# Patient Record
Sex: MALE | Race: WHITE | HISPANIC OR LATINO | Employment: FULL TIME | URBAN - METROPOLITAN AREA
[De-identification: names, ages, dates, MRNs, and addresses within clinical notes are randomized per-mention and may not be internally consistent; named-entity substitution may affect disease eponyms.]

---

## 2021-01-17 ENCOUNTER — APPOINTMENT (OUTPATIENT)
Dept: RADIOLOGY | Facility: MEDICAL CENTER | Age: 36
End: 2021-01-17
Attending: EMERGENCY MEDICINE
Payer: COMMERCIAL

## 2021-01-17 ENCOUNTER — HOSPITAL ENCOUNTER (EMERGENCY)
Facility: MEDICAL CENTER | Age: 36
End: 2021-01-17
Attending: EMERGENCY MEDICINE
Payer: COMMERCIAL

## 2021-01-17 VITALS
DIASTOLIC BLOOD PRESSURE: 68 MMHG | TEMPERATURE: 98.2 F | BODY MASS INDEX: 37.38 KG/M2 | OXYGEN SATURATION: 97 % | HEART RATE: 71 BPM | RESPIRATION RATE: 18 BRPM | HEIGHT: 74 IN | WEIGHT: 291.23 LBS | SYSTOLIC BLOOD PRESSURE: 122 MMHG

## 2021-01-17 DIAGNOSIS — R03.0 ELEVATED BLOOD PRESSURE READING: ICD-10-CM

## 2021-01-17 DIAGNOSIS — R74.01 ELEVATED TRANSAMINASE LEVEL: ICD-10-CM

## 2021-01-17 DIAGNOSIS — R51.9 ACUTE NONINTRACTABLE HEADACHE, UNSPECIFIED HEADACHE TYPE: ICD-10-CM

## 2021-01-17 LAB
ALBUMIN SERPL BCP-MCNC: 4.2 G/DL (ref 3.2–4.9)
ALBUMIN/GLOB SERPL: 1.2 G/DL
ALP SERPL-CCNC: 96 U/L (ref 30–99)
ALT SERPL-CCNC: 287 U/L (ref 2–50)
ANION GAP SERPL CALC-SCNC: 11 MMOL/L (ref 7–16)
AST SERPL-CCNC: 158 U/L (ref 12–45)
BASOPHILS # BLD AUTO: 0.4 % (ref 0–1.8)
BASOPHILS # BLD: 0.04 K/UL (ref 0–0.12)
BILIRUB SERPL-MCNC: 0.8 MG/DL (ref 0.1–1.5)
BUN SERPL-MCNC: 8 MG/DL (ref 8–22)
CALCIUM SERPL-MCNC: 9.3 MG/DL (ref 8.5–10.5)
CHLORIDE SERPL-SCNC: 107 MMOL/L (ref 96–112)
CO2 SERPL-SCNC: 24 MMOL/L (ref 20–33)
CREAT SERPL-MCNC: 0.58 MG/DL (ref 0.5–1.4)
EOSINOPHIL # BLD AUTO: 0.16 K/UL (ref 0–0.51)
EOSINOPHIL NFR BLD: 1.6 % (ref 0–6.9)
ERYTHROCYTE [DISTWIDTH] IN BLOOD BY AUTOMATED COUNT: 42.6 FL (ref 35.9–50)
GLOBULIN SER CALC-MCNC: 3.4 G/DL (ref 1.9–3.5)
GLUCOSE SERPL-MCNC: 82 MG/DL (ref 65–99)
HCT VFR BLD AUTO: 47.7 % (ref 42–52)
HGB BLD-MCNC: 16.2 G/DL (ref 14–18)
IMM GRANULOCYTES # BLD AUTO: 0.04 K/UL (ref 0–0.11)
IMM GRANULOCYTES NFR BLD AUTO: 0.4 % (ref 0–0.9)
LYMPHOCYTES # BLD AUTO: 3.71 K/UL (ref 1–4.8)
LYMPHOCYTES NFR BLD: 36.2 % (ref 22–41)
MCH RBC QN AUTO: 30.7 PG (ref 27–33)
MCHC RBC AUTO-ENTMCNC: 34 G/DL (ref 33.7–35.3)
MCV RBC AUTO: 90.5 FL (ref 81.4–97.8)
MONOCYTES # BLD AUTO: 0.87 K/UL (ref 0–0.85)
MONOCYTES NFR BLD AUTO: 8.5 % (ref 0–13.4)
NEUTROPHILS # BLD AUTO: 5.44 K/UL (ref 1.82–7.42)
NEUTROPHILS NFR BLD: 52.9 % (ref 44–72)
NRBC # BLD AUTO: 0 K/UL
NRBC BLD-RTO: 0 /100 WBC
PLATELET # BLD AUTO: 302 K/UL (ref 164–446)
PMV BLD AUTO: 8.8 FL (ref 9–12.9)
POTASSIUM SERPL-SCNC: 4.1 MMOL/L (ref 3.6–5.5)
PROT SERPL-MCNC: 7.6 G/DL (ref 6–8.2)
RBC # BLD AUTO: 5.27 M/UL (ref 4.7–6.1)
SODIUM SERPL-SCNC: 142 MMOL/L (ref 135–145)
WBC # BLD AUTO: 10.3 K/UL (ref 4.8–10.8)

## 2021-01-17 PROCEDURE — 85025 COMPLETE CBC W/AUTO DIFF WBC: CPT

## 2021-01-17 PROCEDURE — 80053 COMPREHEN METABOLIC PANEL: CPT

## 2021-01-17 PROCEDURE — 700111 HCHG RX REV CODE 636 W/ 250 OVERRIDE (IP): Performed by: EMERGENCY MEDICINE

## 2021-01-17 PROCEDURE — 96374 THER/PROPH/DIAG INJ IV PUSH: CPT

## 2021-01-17 PROCEDURE — 99284 EMERGENCY DEPT VISIT MOD MDM: CPT

## 2021-01-17 PROCEDURE — 70498 CT ANGIOGRAPHY NECK: CPT

## 2021-01-17 PROCEDURE — 70496 CT ANGIOGRAPHY HEAD: CPT

## 2021-01-17 PROCEDURE — 700117 HCHG RX CONTRAST REV CODE 255: Performed by: EMERGENCY MEDICINE

## 2021-01-17 RX ORDER — MORPHINE SULFATE 4 MG/ML
4 INJECTION, SOLUTION INTRAMUSCULAR; INTRAVENOUS ONCE
Status: COMPLETED | OUTPATIENT
Start: 2021-01-17 | End: 2021-01-17

## 2021-01-17 RX ADMIN — MORPHINE SULFATE 4 MG: 4 INJECTION INTRAVENOUS at 17:00

## 2021-01-17 RX ADMIN — IOHEXOL 80 ML: 350 INJECTION, SOLUTION INTRAVENOUS at 18:10

## 2021-01-17 SDOH — HEALTH STABILITY: MENTAL HEALTH: HOW MANY STANDARD DRINKS CONTAINING ALCOHOL DO YOU HAVE ON A TYPICAL DAY?: 1 OR 2

## 2021-01-17 ASSESSMENT — LIFESTYLE VARIABLES
DOES PATIENT WANT TO STOP DRINKING: NO
DO YOU DRINK ALCOHOL: NO

## 2021-01-17 NOTE — ED TRIAGE NOTES
"Chief Complaint   Patient presents with   • Headache     X 1 week, posterior 'pressure', some nausea, denies vision changes or weakness. Denies hx of HA. Denies covid exposure, denies other viral sx.      Pt arrives to triage by self for above complaint, NAD, GCS 15, VSS on RA.     Pt returned to lobby. Educated on triage process and to inform staff of any changes.     /99   Pulse 78   Temp 36.6 °C (97.9 °F) (Temporal)   Resp 16   Ht 1.88 m (6' 2\")   Wt (!) 132.1 kg (291 lb 3.6 oz)   SpO2 98%   BMI 37.39 kg/m²      "

## 2021-01-18 NOTE — ED PROVIDER NOTES
ED Provider Note    CHIEF COMPLAINT  Chief Complaint   Patient presents with   • Headache     X 1 week, posterior 'pressure', some nausea, denies vision changes or weakness. Denies hx of HA. Denies covid exposure, denies other viral sx.         [facility]    Primary care provider: No primary care provider on file.   History obtained from: Patient  History limited by: None     Jose Miguel Luna is a 35 y.o. male who presents to the ED complaining of severe posterior headache.  This started a week ago when he woke up with subsequent forceful nausea and vomiting.  He subsequently developed severe posterior headache that has been persistent pressure since.  He states that the headache is worse with coughing or sneezing.  He denies visual change/weakness/sensory change.  He no longer has any further nausea or vomiting.  He denies recent fever/chills/congestion/sore throat/change in taste or smell/shortness of breath or difficulty breathing/diarrhea/dysuria/rash.  He reports that he has a chronic cough which is unchanged.  No prior history of headaches.  He states that he takes Advil as needed for chronic back pain and has been using Advil and ibuprofen for his headache with improvement.    REVIEW OF SYSTEMS  Please see HPI for pertinent positives/negatives.  All other systems reviewed and are negative.     PAST MEDICAL HISTORY  No past medical history on file.     SURGICAL HISTORY  History reviewed. No pertinent surgical history.     SOCIAL HISTORY  Social History     Tobacco Use   • Smoking status: Never Smoker   • Smokeless tobacco: Never Used   Substance and Sexual Activity   • Alcohol use: Yes     Drinks per session: 1 or 2   • Drug use: Yes     Comment: marijuana   • Sexual activity: Not on file        FAMILY HISTORY  History reviewed. No pertinent family history.     CURRENT MEDICATIONS  Home Medications     Reviewed by Patrick Gilmore R.N. (Registered Nurse) on 01/17/21 at 1522  Med List Status: Partial  "  Medication Last Dose Status        Patient Kendall Taking any Medications                        ALLERGIES  No Known Allergies     PHYSICAL EXAM  VITAL SIGNS: /68   Pulse 71   Temp 36.8 °C (98.2 °F)   Resp 18   Ht 1.88 m (6' 2\")   Wt (!) 132.1 kg (291 lb 3.6 oz)   SpO2 97%   BMI 37.39 kg/m²  @[id number]::@     Pulse ox interpretation: 98% I interpret this pulse ox as normal     Constitutional: Well developed, well nourished, alert in no apparent distress, nontoxic appearance    HENT: No external signs of trauma, normocephalic, oropharynx moist and clear, nose normal    Eyes: PERRL, EOMI, vision and visual fields are grossly intact bilaterally, conjunctiva without erythema, no discharge, no icterus    Neck: Soft and supple, trachea midline, no stridor, no tenderness, no LAD, no JVD, good ROM    Cardiovascular: Regular rate and rhythm, no murmurs/rubs/gallops, strong distal pulses and good perfusion    Thorax & Lungs: No respiratory distress, CTAB   Abdomen: Soft, nontender, nondistended, no guarding, no rebound, normal BS    Back: No CVAT    Extremities: No cyanosis, no edema, no gross deformity, good ROM, intact distal pulses with brisk cap refill    Skin: Warm, dry, no pallor/cyanosis, no rash noted    Lymphatic: No lymphadenopathy noted    Neuro: Alert and oriented to person, place, and time.  GCS 15.  CN II-XII grossly intact.  Normal speech.  Equal strength bilateral UE/LE.  Sensation intact to touch.  No cerebellar signs.   Psychiatric: Cooperative, slightly anxious      NIH STROKE SCALE    1A: Level of Consciousness=0  Alert; keenly responsive 0  Arouses to minor stimulation +1  Requires repeated stimulation to arouse +2  Movements to pain +2  Postures or unresponsive +3    1B: Ask Month and Age=0  Both questions right 0  1 question right +1  0 questions right +2  Dysarthric/intubated/trauma/language barrier +1  Aphasic +2    1C: 'Blink Eyes' & 'Squeeze Hands'=0  (Pantomime commands if " communication barrier)  Performs both tasks 0  Performs 1 task +1  Performs 0 tasks +2    2: Test Horizontal Extraocular Movements=0  Normal 0  Partial gaze palsy: can be overcome +1  Partial gaze palsy: corrects with oculocephalic reflex +1  Forced gaze palsy: cannot be overcome +2    3: Test Visual Fields=0  No visual loss 0  Partial hemianopia +1  Complete hemianopia +2  Patient is bilaterally blind +3  Bilateral hemianopia +3    4: Test Facial Palsy=0  (Use grimace if obtunded)  Normal symmetry 0  Minor paralysis (flat nasolabial fold, smile asymetry) +1  Partial paralysis (lower face) +2  Unilateral complete paralysis (upper/lower face) +3  Bilateral complete paralysis (upper/lower face) +3    5A: Test Left Arm Motor Drift=0  No drift for 10 Seconds 0  Drift, but doesn't hit bed +1  Drift, hits bed +2  Some effort against gravity +2  No effort against gravity +3  No movement +4  Amputation/joint fusion 0    5B: Test Right Arm Motor Drift=0  No drift for 10 seconds 0  Drift, but doesn't hit bed +1  Drift, hits bed +2  Some effort against gravity +2  No effort against gravity +3  No movement +4  Amputation/joint fusion 0    6A: Test Left Leg Motor Drift=0  No drift for 5 Seconds 0  Drift, but doesn't hit bed +1  Drift, hits bed +2  Some effort against gravity +2  No effort against gravity +3  No movement +4  Amputation/joint fusion 0    6B: Test Right Leg Motor Drift=0  No drift for 5 Seconds 0  Drift, but doesn't hit bed +1  Drift, hits bed +2  Some effort against gravity +2  No effort against gravity +3  No movement +4  Amputation/joint fusion 0    7: Test Limb Ataxia =0  (FNF/Heel-Shin)  No ataxia 0  Ataxia in 1 limb +1  Ataxia in 2 limbs +2  Does not understand 0  Paralyzed 0  Amputation/joint fusion 0    8: Test Sensation=0  Normal; no sensory loss 0  Mild-moderate loss: less sharp/more dull +1  Mild-moderate loss: can sense being touched +1  Complete loss: cannot sense being touched at all +2  No response  and quadriplegic +2  Coma/unresponsive +2    9: Test Language/Aphasia=0  Normal; no aphasia 0  Mild-moderate aphasia: some obvious changes, without significant limitation +1  Severe aphasia: fragmentary expression, inference needed, cannot identify materials +2  Mute/global aphasia: no usable speech/auditory comprehension +3  Coma/unresponsive +3    10: Test Dysarthria=0  Normal 0  Mild-moderate dysarthria: slurring but can be understood +1  Severe dysarthria: unintelligble slurring or out of proportion to dysphasia +2  Mute/anarthric +2  Intubated/unable to test 0    11: Test Extinction/Inattention=0  No abnormality 0  Visual/tactile/auditory/spatial/personal inattention +1  Extinction to bilateral simultaneous stimulation +1  Profound franklin-inattention (ex: does not recognize own hand) +2  Extinction to >1 modality +2      NIH Stroke Scale=0        DIAGNOSTIC STUDIES / PROCEDURES        LABS  All labs reviewed by me.     Results for orders placed or performed during the hospital encounter of 01/17/21   CBC WITH DIFFERENTIAL   Result Value Ref Range    WBC 10.3 4.8 - 10.8 K/uL    RBC 5.27 4.70 - 6.10 M/uL    Hemoglobin 16.2 14.0 - 18.0 g/dL    Hematocrit 47.7 42.0 - 52.0 %    MCV 90.5 81.4 - 97.8 fL    MCH 30.7 27.0 - 33.0 pg    MCHC 34.0 33.7 - 35.3 g/dL    RDW 42.6 35.9 - 50.0 fL    Platelet Count 302 164 - 446 K/uL    MPV 8.8 (L) 9.0 - 12.9 fL    Neutrophils-Polys 52.90 44.00 - 72.00 %    Lymphocytes 36.20 22.00 - 41.00 %    Monocytes 8.50 0.00 - 13.40 %    Eosinophils 1.60 0.00 - 6.90 %    Basophils 0.40 0.00 - 1.80 %    Immature Granulocytes 0.40 0.00 - 0.90 %    Nucleated RBC 0.00 /100 WBC    Neutrophils (Absolute) 5.44 1.82 - 7.42 K/uL    Lymphs (Absolute) 3.71 1.00 - 4.80 K/uL    Monos (Absolute) 0.87 (H) 0.00 - 0.85 K/uL    Eos (Absolute) 0.16 0.00 - 0.51 K/uL    Baso (Absolute) 0.04 0.00 - 0.12 K/uL    Immature Granulocytes (abs) 0.04 0.00 - 0.11 K/uL    NRBC (Absolute) 0.00 K/uL   Comp Metabolic Panel    Result Value Ref Range    Sodium 142 135 - 145 mmol/L    Potassium 4.1 3.6 - 5.5 mmol/L    Chloride 107 96 - 112 mmol/L    Co2 24 20 - 33 mmol/L    Anion Gap 11.0 7.0 - 16.0    Glucose 82 65 - 99 mg/dL    Bun 8 8 - 22 mg/dL    Creatinine 0.58 0.50 - 1.40 mg/dL    Calcium 9.3 8.5 - 10.5 mg/dL    AST(SGOT) 158 (H) 12 - 45 U/L    ALT(SGPT) 287 (H) 2 - 50 U/L    Alkaline Phosphatase 96 30 - 99 U/L    Total Bilirubin 0.8 0.1 - 1.5 mg/dL    Albumin 4.2 3.2 - 4.9 g/dL    Total Protein 7.6 6.0 - 8.2 g/dL    Globulin 3.4 1.9 - 3.5 g/dL    A-G Ratio 1.2 g/dL   ESTIMATED GFR   Result Value Ref Range    GFR If African American >60 >60 mL/min/1.73 m 2    GFR If Non African American >60 >60 mL/min/1.73 m 2        RADIOLOGY  The radiologist's interpretation of all radiological studies have been reviewed by me.     CT-CTA NECK WITH & W/O-POST PROCESSING   Final Result      No stenosis or dissection is seen in the carotid or vertebral arteries bilaterally.      Borderline right submandibular lymph node is nonspecific and may be reactive.      Minimal mucosal thickening in the maxillary sinuses.         CT-CTA HEAD WITH & W/O-POST PROCESS   Final Result      No acute intracranial abnormality is seen.      No thrombosis or aneurysm is seen within the [place].      Minimal mucosal thickening left maxillary and frontal sinus.             COURSE & MEDICAL DECISION MAKING  Nursing notes, VS, PMSFHx reviewed in chart.     Review of past medical records shows the patient without prior visits to this ED.      Differential diagnoses considered include but are not limited to: Tension/migraine/cluster HA, intracranial hemorrhage/SAH, aneurysm, dissection, central venous thrombosis, tumor/cancer, cervical strain/sprain       History and physical exam as above.  Labs were fairly unremarkable except for mildly elevated transaminases.  Given the sudden onset of his headache that has been persistent and severe, CTA of head and neck was  performed without evidence for acute abnormality as above.  Patient was given morphine with improvement of his headache.  I discussed the findings with the patient.  He is noted to be using his cell phone in bed in no acute distress and nontoxic in appearance.  His exam continues to be benign without any focal neurological findings.  Low clinical suspicion at this time for acute serious pathology such as intracranial bleed, CVA, dissection, meningitis/encephalitis given the history/exam/findings.  He was advised on outpatient follow-up regarding further evaluation especially given his mildly elevated transaminases as well was readings of elevated blood pressure.  Return to ED precautions were given.  Discussed with patient supportive home care including hydration, rest and using acetaminophen/ibuprofen as needed.  He verbalized understanding and agreed with plan of care with no further questions or concerns.      The patient is referred to a primary physician for blood pressure management, diabetic screening, and for all other preventative health concerns.       FINAL IMPRESSION  1. Acute nonintractable headache, unspecified headache type Acute   2. Elevated transaminase level Active   3. Elevated blood pressure reading Active          DISPOSITION  Patient will be discharged home in stable condition.       FOLLOW UP  Please follow-up with your doctor    Call in 1 day      [facility], Emergency Dept  [address] [place] [id number] [phone]    If symptoms worsen         OUTPATIENT MEDICATIONS  There are no discharge medications for this patient.         Electronically signed by: Chidi Ledesma D.O., 1/17/2021 4:36 PM      Portions of this record were made with voice recognition software.  Despite my review, spelling/grammar/context errors may still remain.  Interpretation of this chart should be taken in this context.

## 2022-03-29 ENCOUNTER — TELEPHONE (OUTPATIENT)
Dept: SCHEDULING | Facility: IMAGING CENTER | Age: 37
End: 2022-03-29
Payer: COMMERCIAL

## 2022-04-11 ENCOUNTER — OFFICE VISIT (OUTPATIENT)
Dept: MEDICAL GROUP | Facility: PHYSICIAN GROUP | Age: 37
End: 2022-04-11
Payer: COMMERCIAL

## 2022-04-11 ENCOUNTER — HOSPITAL ENCOUNTER (OUTPATIENT)
Dept: LAB | Facility: MEDICAL CENTER | Age: 37
End: 2022-04-11
Attending: STUDENT IN AN ORGANIZED HEALTH CARE EDUCATION/TRAINING PROGRAM
Payer: COMMERCIAL

## 2022-04-11 VITALS
WEIGHT: 296.2 LBS | HEART RATE: 91 BPM | BODY MASS INDEX: 38.01 KG/M2 | TEMPERATURE: 97.8 F | SYSTOLIC BLOOD PRESSURE: 146 MMHG | RESPIRATION RATE: 18 BRPM | HEIGHT: 74 IN | DIASTOLIC BLOOD PRESSURE: 82 MMHG | OXYGEN SATURATION: 100 %

## 2022-04-11 DIAGNOSIS — R74.01 TRANSAMINITIS: ICD-10-CM

## 2022-04-11 DIAGNOSIS — Z11.3 ROUTINE SCREENING FOR STI (SEXUALLY TRANSMITTED INFECTION): ICD-10-CM

## 2022-04-11 DIAGNOSIS — Z00.00 HEALTHCARE MAINTENANCE: ICD-10-CM

## 2022-04-11 DIAGNOSIS — M54.50 ACUTE RIGHT-SIDED LOW BACK PAIN WITHOUT SCIATICA: ICD-10-CM

## 2022-04-11 DIAGNOSIS — Z23 NEED FOR VACCINATION: ICD-10-CM

## 2022-04-11 LAB
ERYTHROCYTE [DISTWIDTH] IN BLOOD BY AUTOMATED COUNT: 43.4 FL (ref 35.9–50)
EST. AVERAGE GLUCOSE BLD GHB EST-MCNC: 117 MG/DL
GFR SERPLBLD CREATININE-BSD FMLA CKD-EPI: 125 ML/MIN/1.73 M 2
HBA1C MFR BLD: 5.7 % (ref 4–5.6)
HCT VFR BLD AUTO: 47.1 % (ref 42–52)
HGB BLD-MCNC: 16.2 G/DL (ref 14–18)
MCH RBC QN AUTO: 31.2 PG (ref 27–33)
MCHC RBC AUTO-ENTMCNC: 34.4 G/DL (ref 33.7–35.3)
MCV RBC AUTO: 90.8 FL (ref 81.4–97.8)
PLATELET # BLD AUTO: 277 K/UL (ref 164–446)
PMV BLD AUTO: 8.3 FL (ref 9–12.9)
RBC # BLD AUTO: 5.19 M/UL (ref 4.7–6.1)
WBC # BLD AUTO: 10.7 K/UL (ref 4.8–10.8)

## 2022-04-11 PROCEDURE — 86780 TREPONEMA PALLIDUM: CPT

## 2022-04-11 PROCEDURE — 90471 IMMUNIZATION ADMIN: CPT | Performed by: STUDENT IN AN ORGANIZED HEALTH CARE EDUCATION/TRAINING PROGRAM

## 2022-04-11 PROCEDURE — 90715 TDAP VACCINE 7 YRS/> IM: CPT | Performed by: STUDENT IN AN ORGANIZED HEALTH CARE EDUCATION/TRAINING PROGRAM

## 2022-04-11 PROCEDURE — 80053 COMPREHEN METABOLIC PANEL: CPT

## 2022-04-11 PROCEDURE — 85027 COMPLETE CBC AUTOMATED: CPT

## 2022-04-11 PROCEDURE — 99203 OFFICE O/P NEW LOW 30 MIN: CPT | Mod: 25 | Performed by: STUDENT IN AN ORGANIZED HEALTH CARE EDUCATION/TRAINING PROGRAM

## 2022-04-11 PROCEDURE — 87491 CHLMYD TRACH DNA AMP PROBE: CPT

## 2022-04-11 PROCEDURE — 83036 HEMOGLOBIN GLYCOSYLATED A1C: CPT

## 2022-04-11 PROCEDURE — 87591 N.GONORRHOEAE DNA AMP PROB: CPT

## 2022-04-11 PROCEDURE — 36415 COLL VENOUS BLD VENIPUNCTURE: CPT

## 2022-04-11 PROCEDURE — 87389 HIV-1 AG W/HIV-1&-2 AB AG IA: CPT

## 2022-04-11 ASSESSMENT — PATIENT HEALTH QUESTIONNAIRE - PHQ9: CLINICAL INTERPRETATION OF PHQ2 SCORE: 0

## 2022-04-11 ASSESSMENT — FIBROSIS 4 INDEX: FIB4 SCORE: 1.08

## 2022-04-11 NOTE — PROGRESS NOTES
"Subjective:     CC:  Diagnoses of Healthcare maintenance, Routine screening for STI (sexually transmitted infection), Need for vaccination, Acute right-sided low back pain without sciatica, and Transaminitis were pertinent to this visit.    HISTORY OF THE PRESENT ILLNESS: Patient is a 36 y.o. male. This pleasant patient is here today to establish care.  He has not had primary care and many years.    1.  Acute right-sided low back pain without sciatica.  Onset about 3 weeks ago.  Location right lower back.  Pain comes and goes.  Characterizes this is an ache.  No overuse activity or eliciting event.  He treats this with EtOH.    2.  Transaminitis  ER labs on January of last year show transaminitis.  Patient reports daily alcohol consumption of 15-20 beers plus a couple shots daily.    3.  Healthcare maintenance  He has reason to be concerned about STIs.    Active Diagnosis:    Patient Active Problem List   Diagnosis   • Transaminitis      Current Outpatient Medications Ordered in Epic   Medication Sig Dispense Refill   • DM-Doxylamine-Acetaminophen (NYQUIL COLD & FLU PO) Take  by mouth.       No current Epic-ordered facility-administered medications on file.     ROS:   Gen: No fevers/chills, no changes in weight  HEENT: No changes in vision/hearing, sore throat.  Pulm: No cough, unexplained SOB.  CV: No chest pain/pressure, no palpitations  GI: No nausea/vomiting, no diarrhea  : No dysuria/nocturia  MSk: See HPI.  Skin: No rash/skin changes  Neuro: No headaches, no numbness/tingling  Heme/Lymph: no easy bruising      Objective:     Exam: /82 (BP Location: Left arm, Patient Position: Sitting, BP Cuff Size: Large adult)   Pulse 91   Temp 36.6 °C (97.8 °F) (Temporal)   Resp 18   Ht 1.88 m (6' 2\")   Wt (!) 134 kg (296 lb 3.2 oz)   SpO2 100%  Body mass index is 38.03 kg/m².    General: Normal appearing. No distress.  HEENT: Normocephalic. Eyes conjunctiva clear lids without ptosis, pupils equal and reactive " to light accommodation. Ears normal shape and contour, canals are clear bilaterally, tympanic membranes are benign, nasal mucosa benign, oropharynx is without erythema, edema or exudates.   Neck: Supple without JVD. Thyroid is not enlarged.  Pulmonary: Clear to ausculation.  Normal effort. No rales, ronchi, or wheezing.  Cardiovascular: Regular rate and rhythm without murmur. Radial pulses are intact and equal bilaterally.  Abdomen: Obese.   Neurologic: Grossly nonfocal.  CN II through XII intact.  Lymph: No cervical or supraclavicular lymph nodes are palpable  Skin: Warm and dry.  No obvious lesions.  Musculoskeletal: Normal gait. No extremity cyanosis, clubbing, or edema.  Psych: Normal mood and affect. Alert and oriented x3. Judgment and insight are normal.    A chaperone was offered to the patient during today's exam. Patient declined chaperone.    Labs:   1/17/2021:  -CMP, elevated , elevated , otherwise normal.  This was an ER CMP.    Assessment & Plan:   36 y.o. male with the following -    1.  Acute right-sided low back pain without sciatica.  -Highly likely muscular strain.  -I have instructed the patient to limit recumbent time to 8 hours/day and directed him to a series of low back pain stretching videos to follow.  -Instructed the patient is okay to use ibuprofen 800 mg 3 times daily as needed for pain.  Avoid Tylenol for now.    2.  Transaminitis  -Patient agrees to diminish his alcohol consumption to one 24 pack/week and to not consume these all in one setting.  -CMP    3.  Healthcare maintenance.  -Patient has recently purchased a gym membership and he agrees to 3 to 5 30-minute plus sessions of exercise weekly.  -Tdap vaccine provided in clinic today.  -CBC, CMP, hemoglobin A1c.  -STI panel.    Return in about 3 months (around 7/11/2022).  Low back pain follow-up and further health maintenance.  Please note that this dictation was created using voice recognition software. I have made  every reasonable attempt to correct obvious errors, but I expect that there are errors of grammar and possibly content that I did not discover before finalizing the note.      Dangelo Armas PA-C 4/11/2022

## 2022-04-12 LAB
C TRACH DNA SPEC QL NAA+PROBE: NEGATIVE
HIV 1+2 AB+HIV1 P24 AG SERPL QL IA: NORMAL
N GONORRHOEA DNA SPEC QL NAA+PROBE: NEGATIVE
SPECIMEN SOURCE: NORMAL
T PALLIDUM AB SER QL IA: NORMAL

## 2022-04-13 ENCOUNTER — TELEPHONE (OUTPATIENT)
Dept: MEDICAL GROUP | Facility: PHYSICIAN GROUP | Age: 37
End: 2022-04-13
Payer: COMMERCIAL

## 2022-04-13 LAB
ALBUMIN SERPL BCP-MCNC: 4.5 G/DL (ref 3.2–4.9)
ALBUMIN/GLOB SERPL: 1 G/DL
ALP SERPL-CCNC: 86 U/L (ref 30–99)
ALT SERPL-CCNC: 108 U/L (ref 2–50)
ANION GAP SERPL CALC-SCNC: 20 MMOL/L (ref 7–16)
AST SERPL-CCNC: 60 U/L (ref 12–45)
BILIRUB SERPL-MCNC: 1 MG/DL (ref 0.1–1.5)
BUN SERPL-MCNC: 7 MG/DL (ref 8–22)
CALCIUM SERPL-MCNC: 9.9 MG/DL (ref 8.5–10.5)
CHLORIDE SERPL-SCNC: 102 MMOL/L (ref 96–112)
CO2 SERPL-SCNC: 19 MMOL/L (ref 20–33)
CREAT SERPL-MCNC: 0.65 MG/DL (ref 0.5–1.4)
GLOBULIN SER CALC-MCNC: 4.3 G/DL (ref 1.9–3.5)
GLUCOSE SERPL-MCNC: 98 MG/DL (ref 65–99)
POTASSIUM SERPL-SCNC: 4.8 MMOL/L (ref 3.6–5.5)
PROT SERPL-MCNC: 8.8 G/DL (ref 6–8.2)
SODIUM SERPL-SCNC: 141 MMOL/L (ref 135–145)

## 2022-04-13 NOTE — TELEPHONE ENCOUNTER
----- Message from Dangelo Armas P.A.-C. sent at 4/13/2022  9:03 AM PDT -----  Please contact the lab and see if they can re-measure Mr. Rivas's sodium from 4/11/2022 labs.    Dangelo Armas PA-C

## 2022-04-13 NOTE — TELEPHONE ENCOUNTER
Phone Number Called: [phone] opt 3    Call outcome: Spoke with CLIFTON Gastelum     Message: Asked her to re-measure the Pt's sodium levels.  She asked if Dangelo Armas P.A.-C. Wanted them re-drawn or just re-calculated.  I told her to re-calculate.  Did you mean that?  Please advise.

## 2022-04-14 ENCOUNTER — TELEPHONE (OUTPATIENT)
Dept: MEDICAL GROUP | Facility: PHYSICIAN GROUP | Age: 37
End: 2022-04-14
Payer: COMMERCIAL

## 2022-04-14 NOTE — TELEPHONE ENCOUNTER
----- Message from Dangelo Armas P.A.-C. sent at 4/13/2022  4:11 PM PDT -----  Please contact the patient by telephone and provide the following results and instructions:    Good morning Jose Miguel Luna,    Concerning your recent laboratory studies:    -Your complete metabolic panel (CMP) shows multiple abnormalities including greatly elevated liver enzymes suggestive of alcoholic hepatitis.  As we discussed in your most recent visit, your continued health is dependent upon greatly backing off on your alcohol consumption.    -Your complete blood count (CBC) is normal.    -Your STI panel is negative for syphilis, HIV, chlamydia, gonorrhea.    -Your hemoglobin A1c is elevated at 5.7%.  This suggests prediabetes.  Exercise, careful diet and weight loss are the recommended treatments at this stage.  If you wish I can refer you over to a registered dietitian for additional recommendations.    Let Me know if you have any questions,    Dangelo Armas PA-C 4/13/2022

## 2022-04-14 NOTE — TELEPHONE ENCOUNTER
VOICEMAIL  1. Caller Name: CLIFTON CHAVEZ from [employer]          Call Back Number: [phone] opt 3    2. Message: Called with questions about re-calculation of sodium results, (questions not provided).  Asked for a cb.

## 2022-04-14 NOTE — TELEPHONE ENCOUNTER
Phone Number Called: [phone] (home)     Call outcome: Spoke to patient regarding message below.    Message: Spoke with the Pt on yesterday 04/13/22, Pt informed of the message below.  Pt had questions about his liver results and wanted to know what the results would mean for his liver function.  Informed the Pt could make a follow up appt to discuss his labs in detail with the PCP.  Pt agreed, scheduled the Pt a FV with the PCP on 05/13/22.  No further questions at this time.

## 2022-04-14 NOTE — TELEPHONE ENCOUNTER
VOICEMAIL  1. Caller Name: Kiki harris/ [facility]          Call Back Number: [phone] opt 3    2. Message: Called to provide corrected results.  Asked for a cb.

## 2022-04-26 ENCOUNTER — OFFICE VISIT (OUTPATIENT)
Dept: MEDICAL GROUP | Facility: PHYSICIAN GROUP | Age: 37
End: 2022-04-26
Payer: COMMERCIAL

## 2022-04-26 VITALS
SYSTOLIC BLOOD PRESSURE: 142 MMHG | HEIGHT: 74 IN | BODY MASS INDEX: 36.7 KG/M2 | TEMPERATURE: 98 F | OXYGEN SATURATION: 96 % | DIASTOLIC BLOOD PRESSURE: 90 MMHG | HEART RATE: 106 BPM | WEIGHT: 286 LBS

## 2022-04-26 DIAGNOSIS — J02.9 PHARYNGITIS, UNSPECIFIED ETIOLOGY: ICD-10-CM

## 2022-04-26 LAB
HETEROPH AB SER QL LA: NEGATIVE
INT CON NEG: NEGATIVE
INT CON NEG: NEGATIVE
INT CON POS: POSITIVE
INT CON POS: POSITIVE
S PYO AG THROAT QL: POSITIVE

## 2022-04-26 PROCEDURE — 87880 STREP A ASSAY W/OPTIC: CPT | Performed by: STUDENT IN AN ORGANIZED HEALTH CARE EDUCATION/TRAINING PROGRAM

## 2022-04-26 PROCEDURE — 99213 OFFICE O/P EST LOW 20 MIN: CPT | Performed by: STUDENT IN AN ORGANIZED HEALTH CARE EDUCATION/TRAINING PROGRAM

## 2022-04-26 PROCEDURE — 86308 HETEROPHILE ANTIBODY SCREEN: CPT | Performed by: STUDENT IN AN ORGANIZED HEALTH CARE EDUCATION/TRAINING PROGRAM

## 2022-04-26 RX ORDER — PENICILLIN V POTASSIUM 500 MG/1
500 TABLET ORAL
Qty: 30 TABLET | Refills: 0 | Status: SHIPPED | OUTPATIENT
Start: 2022-04-26 | End: 2022-09-08

## 2022-04-26 ASSESSMENT — FIBROSIS 4 INDEX: FIB4 SCORE: 0.73

## 2022-04-26 NOTE — PROGRESS NOTES
"CC:  The encounter diagnosis was Pharyngitis, unspecified etiology.    HISTORY OF THE PRESENT ILLNESS: Patient is a 36 y.o. male. This pleasant patient is here today to discuss sore throat.    History metipranolol developed a sore throat with blisters about 10 days ago.  He states that it hurts to swallow.  He has been treating with ibuprofen exclusively.    Positive for subjective fever.    Negative for rash, loss of taste or smell, loss of appetite, cough, nasal congestion..    No problem-specific Assessment & Plan notes found for this encounter.      No current [facility]-ordered outpatient medications on file.     No current [facility]-ordered facility-administered medications on file.     ROS:   Gen: no fevers/chills, unplanned changes in weight  See HPI.  Objective:     Exam: /90 (BP Location: Right arm, Patient Position: Sitting, BP Cuff Size: Adult long)   Pulse (!) 106   Temp 36.7 °C (98 °F) (Temporal)   Ht 1.88 m (6' 2\")   Wt (!) 130 kg (286 lb)   SpO2 96%  Body mass index is 36.72 kg/m².    General: Normal appearing. No distress.  HEENT: Normocephalic.  Left posterior oropharynx shows a 5 mm oval white-colored lesion.  Left tonsil is inflamed.  Posterior erythema is evident.  Pulmonary: Clear to ausculation.  Normal effort. No rales, ronchi, or wheezing.  Lymph: No cervical/submandibular or supraclavicular lymph nodes are palpable  Skin: Warm and dry.  No obvious lesions.    A chaperone was offered to the patient during today's exam. Patient declined chaperone.    Labs:   4/26/2022:  -Monospot negative  -Group A strep positive    Assessment & Plan:   36 y.o. male with the following -    1. Pharyngitis, unspecified etiology  -Acute uncomplicated illness.  - POCT Rapid Strep A  - CULTURE THROAT; Future  - POCT Mononucleosis (mono)  - Penicillin  mg 3 times daily.  Dispense 30.  Refill 0.    No follow-ups on file.    Please note that this dictation was created using voice recognition software. I have " made every reasonable attempt to correct obvious errors, but I expect that there are errors of grammar and possibly content that I did not discover before finalizing the note.    Dangelo Armas PA-C 4/26/2022

## 2022-07-12 ENCOUNTER — APPOINTMENT (OUTPATIENT)
Dept: MEDICAL GROUP | Facility: PHYSICIAN GROUP | Age: 37
End: 2022-07-12
Payer: COMMERCIAL

## 2022-09-08 ENCOUNTER — OFFICE VISIT (OUTPATIENT)
Dept: URGENT CARE | Facility: CLINIC | Age: 37
End: 2022-09-08
Payer: COMMERCIAL

## 2022-09-08 VITALS
WEIGHT: 291 LBS | DIASTOLIC BLOOD PRESSURE: 98 MMHG | OXYGEN SATURATION: 96 % | HEART RATE: 105 BPM | BODY MASS INDEX: 37.35 KG/M2 | TEMPERATURE: 98.8 F | SYSTOLIC BLOOD PRESSURE: 158 MMHG | HEIGHT: 74 IN | RESPIRATION RATE: 20 BRPM

## 2022-09-08 DIAGNOSIS — J02.0 ACUTE STREPTOCOCCAL PHARYNGITIS: ICD-10-CM

## 2022-09-08 LAB
INT CON NEG: NORMAL
INT CON POS: NORMAL
S PYO AG THROAT QL: POSITIVE

## 2022-09-08 PROCEDURE — 99213 OFFICE O/P EST LOW 20 MIN: CPT | Performed by: NURSE PRACTITIONER

## 2022-09-08 PROCEDURE — 87880 STREP A ASSAY W/OPTIC: CPT | Performed by: NURSE PRACTITIONER

## 2022-09-08 RX ORDER — AMOXICILLIN 500 MG/1
500 CAPSULE ORAL 2 TIMES DAILY
Qty: 20 CAPSULE | Refills: 0 | Status: SHIPPED | OUTPATIENT
Start: 2022-09-08 | End: 2022-09-18

## 2022-09-08 ASSESSMENT — FIBROSIS 4 INDEX: FIB4 SCORE: 0.75

## 2022-09-08 NOTE — LETTER
September 8, 2022         Patient: Jose Miguel Luna   YOB: 1985   Date of Visit: 9/8/2022           To Whom it May Concern:    Jose Miguel Luna was seen in my clinic on 9/8/2022. He may be excused from work today and tomorrow.    If you have any questions or concerns, please don't hesitate to call.        Sincerely,           ROMINA Henderson.  Electronically Signed

## 2022-09-08 NOTE — PROGRESS NOTES
Chief Complaint   Patient presents with    Pharyngitis     Fever/headache/body aches/back pain/x3days       HISTORY OF PRESENT ILLNESS: Patient is a pleasant 37 y.o. male who presents today due to complaints of a sore throat for the past three days. Reports associated fever, chills, pain with swallowing, body aches, headache. Pain is moderate. Denies associated rash, chest pain, urinary complaints, congestion, cough, or difficulty breathing. He has tried OTC medications at home without much improvement. Denies known ill contacts.       Patient Active Problem List    Diagnosis Date Noted    Transaminitis 04/11/2022       Allergies:Patient has no known allergies.    No current Epic-ordered outpatient medications on file.     No current Epic-ordered facility-administered medications on file.       History reviewed. No pertinent past medical history.    Social History     Tobacco Use    Smoking status: Former     Types: Cigarettes    Smokeless tobacco: Never   Vaping Use    Vaping Use: Never used   Substance Use Topics    Alcohol use: Not Currently     Alcohol/week: 4.2 oz     Types: 7 Standard drinks or equivalent per week     Comment: 7/week     Drug use: Yes     Types: Marijuana, Inhaled, Oral     Comment: marijuana 7/week        No family status information on file.   History reviewed. No pertinent family history.    ROS:  Review of Systems   Constitutional: Positive for fever, chills, malaise, fatigue, body aches. Negative for weight loss.  HENT: Positive for sore throat. Negative for ear pain, nosebleeds, congestion.   Eyes: Negative for vision changes.   Cardiovascular: Negative for chest pain, palpitations, orthopnea and leg swelling.   Respiratory: Negative for cough, sputum production, shortness of breath and wheezing.   Gastrointestinal: Negative for abdominal pain, nausea, vomiting or diarrhea.   : Negative for changes in urination.   Skin: Negative for rash, diaphoresis.     Exam:  BP (!) 158/98 (BP  "Location: Right arm, Patient Position: Sitting)   Pulse (!) 105   Temp 37.1 °C (98.8 °F) (Temporal)   Resp 20   Ht 1.88 m (6' 2\")   Wt (!) 132 kg (291 lb)   SpO2 96%   General: well-nourished, well-developed male in NAD  Head: normocephalic, atraumatic  Eyes: PERRLA, no conjunctival injection, acuity grossly intact, lids normal.  Ears: normal shape and symmetry, no tenderness, no discharge. External canals are without any significant edema or erythema. Tympanic membranes are without any inflammation, no effusion. Gross auditory acuity is intact.  Nose: symmetrical without tenderness, no discharge.  Mouth/Throat: reasonable hygiene. There is erythema, with exudates and tonsillar enlargement present.  Neck: no masses, range of motion within normal limits, no tracheal deviation. No obvious thyroid enlargement.   Lymph: bilateral anterior cervical adenopathy. No supraclavicular adenopathy.   Neuro: alert and oriented. Cranial nerves 1-12 grossly intact. No sensory deficit.   Cardiovascular: regular rate auscultated 95, regular rhythm. No edema.  Pulmonary: no distress. Chest is symmetrical with respiration, no wheezes, crackles, or rhonchi.   Musculoskeletal: no clubbing, appropriate muscle tone, gait is stable.  Skin: warm, dry, intact, no clubbing, no cyanosis, no rashes.   Psych: appropriate mood, affect, judgement.       POC strep positive      Assessment/Plan:  1. Acute streptococcal pharyngitis  POCT Rapid Strep A    amoxicillin (AMOXIL) 500 MG Cap                Antibiotic as directed. OTC motrin or tylenol for pain/fever control. Hand hygiene. Increase fluid intake, rest. Warm salt water gargles.   Supportive care, differential diagnoses, and indications for immediate follow-up discussed with patient.   Pathogenesis of diagnosis discussed including typical length and natural progression.   Instructed to return to clinic or nearest emergency department for any change in condition, further concerns, or " worsening of symptoms.  Patient states understanding of the plan of care and discharge instructions.  Instructed to make an appointment, for follow up, with his primary care provider.        Please note that this dictation was created using voice recognition software. I have made every reasonable attempt to correct obvious errors, but I expect that there are errors of grammar and possibly content that I did not discover before finalizing the note. N95 and safety glasses used for entire visit.       ROMINA Henderson.

## 2022-09-12 ENCOUNTER — OFFICE VISIT (OUTPATIENT)
Dept: MEDICAL GROUP | Facility: PHYSICIAN GROUP | Age: 37
End: 2022-09-12
Payer: COMMERCIAL

## 2022-09-12 VITALS
WEIGHT: 288.6 LBS | HEART RATE: 102 BPM | SYSTOLIC BLOOD PRESSURE: 138 MMHG | DIASTOLIC BLOOD PRESSURE: 72 MMHG | BODY MASS INDEX: 37.04 KG/M2 | TEMPERATURE: 98.7 F | OXYGEN SATURATION: 98 % | HEIGHT: 74 IN | RESPIRATION RATE: 16 BRPM

## 2022-09-12 DIAGNOSIS — F10.10 ETOH ABUSE: ICD-10-CM

## 2022-09-12 DIAGNOSIS — R82.2 BILIRUBINURIA: ICD-10-CM

## 2022-09-12 DIAGNOSIS — R10.9 FLANK PAIN: ICD-10-CM

## 2022-09-12 PROCEDURE — 99214 OFFICE O/P EST MOD 30 MIN: CPT | Performed by: STUDENT IN AN ORGANIZED HEALTH CARE EDUCATION/TRAINING PROGRAM

## 2022-09-12 ASSESSMENT — FIBROSIS 4 INDEX: FIB4 SCORE: 0.75

## 2022-09-12 NOTE — PROGRESS NOTES
"Subjective:     CC:  Diagnoses of Flank pain, ETOH abuse, and Bilirubinuria were pertinent to this visit.    HISTORY OF THE PRESENT ILLNESS: Patient is a 37 y.o. male. This pleasant patient is here today to discuss:    1.  Right-sided flank pain and dark urine.  2.  EtOH abuse  Mr. Luna presents today concerned about dark-colored urine no matter how much water he drinks and right-sided flank pain.  He continues to consume 20 beers and 6 shots per day.    Active Diagnosis:    Patient Active Problem List   Diagnosis    Transaminitis    Flank pain      Current Outpatient Medications Ordered in Epic   Medication Sig Dispense Refill    amoxicillin (AMOXIL) 500 MG Cap Take 1 Capsule by mouth 2 times a day for 10 days. 20 Capsule 0     No current Epic-ordered facility-administered medications on file.     ROS:   Gen: No fevers/chills, no changes in weight  See HPI.    Objective:     Exam: /72 (BP Location: Left arm, Patient Position: Sitting, BP Cuff Size: Large adult)   Pulse (!) 102   Temp 37.1 °C (98.7 °F) (Temporal)   Resp 16   Ht 1.88 m (6' 2\")   Wt (!) 131 kg (288 lb 9.6 oz)   SpO2 98%  Body mass index is 37.05 kg/m².    General: Normal appearing. No distress.  Abdomen: Obese.  Difficult to discern if hepatomegaly is present.  No suprapubic pain.  Right-sided flank pain to percussion.  Skin: Warm and dry.  No obvious lesions.    A chaperone was offered to the patient during today's exam. Patient declined chaperone.    Labs:   4/11/2022:  -CMP showing AST 60, , bilirubin 1.0    9/12/2022:  -POCT UA showing glucose negative, bilirubin small, ketones trace, specific gravity 1.020, blood negative, pH 5.5, protein 30 mg/dL, urobilinogen 1.0, nitrate negative, leukocyte esterase negative.    Assessment & Plan:   37 y.o. male with the following -    1.  Right-sided flank pain with dark urine  -Undiagnosed new problem.  Likely related to #2 below.  -US RUQ  -LFT  -POCT UA    2.  EtOH " abuse  -Chronic  -Refer to behavioral health Dr. Floyd Baron if available.  -In the interim I have asked the patient to cut down from 20 beers and 6 shots to one 12 pack of day of beer.    Return in about 2 months (around 11/12/2022).  For follow-up on #1 and 2 above.    Please note that this dictation was created using voice recognition software. I have made every reasonable attempt to correct obvious errors, but I expect that there are errors of grammar and possibly content that I did not discover before finalizing the note.      Dangelo Armas PA-C 9/12/2022

## 2022-09-28 ENCOUNTER — HOSPITAL ENCOUNTER (OUTPATIENT)
Dept: RADIOLOGY | Facility: MEDICAL CENTER | Age: 37
End: 2022-09-28
Attending: STUDENT IN AN ORGANIZED HEALTH CARE EDUCATION/TRAINING PROGRAM
Payer: COMMERCIAL

## 2022-09-28 ENCOUNTER — HOSPITAL ENCOUNTER (OUTPATIENT)
Dept: LAB | Facility: MEDICAL CENTER | Age: 37
End: 2022-09-28
Attending: STUDENT IN AN ORGANIZED HEALTH CARE EDUCATION/TRAINING PROGRAM
Payer: COMMERCIAL

## 2022-09-28 DIAGNOSIS — R10.9 FLANK PAIN: ICD-10-CM

## 2022-09-28 DIAGNOSIS — R82.2 BILIRUBINURIA: ICD-10-CM

## 2022-09-28 LAB
ALBUMIN SERPL BCP-MCNC: 4.5 G/DL (ref 3.2–4.9)
ALP SERPL-CCNC: 87 U/L (ref 30–99)
ALT SERPL-CCNC: 95 U/L (ref 2–50)
AST SERPL-CCNC: 89 U/L (ref 12–45)
BILIRUB CONJ SERPL-MCNC: 0.3 MG/DL (ref 0.1–0.5)
BILIRUB INDIRECT SERPL-MCNC: 1.1 MG/DL (ref 0–1)
BILIRUB SERPL-MCNC: 1.4 MG/DL (ref 0.1–1.5)
PROT SERPL-MCNC: 9 G/DL (ref 6–8.2)

## 2022-09-28 PROCEDURE — 76705 ECHO EXAM OF ABDOMEN: CPT

## 2022-09-28 PROCEDURE — 36415 COLL VENOUS BLD VENIPUNCTURE: CPT

## 2022-09-28 PROCEDURE — 80076 HEPATIC FUNCTION PANEL: CPT

## 2023-02-23 ENCOUNTER — OFFICE VISIT (OUTPATIENT)
Dept: URGENT CARE | Facility: PHYSICIAN GROUP | Age: 38
End: 2023-02-23
Payer: COMMERCIAL

## 2023-02-23 VITALS
HEIGHT: 72 IN | WEIGHT: 283 LBS | TEMPERATURE: 98.1 F | BODY MASS INDEX: 38.33 KG/M2 | SYSTOLIC BLOOD PRESSURE: 138 MMHG | HEART RATE: 95 BPM | DIASTOLIC BLOOD PRESSURE: 88 MMHG | OXYGEN SATURATION: 95 % | RESPIRATION RATE: 14 BRPM

## 2023-02-23 DIAGNOSIS — M25.469 KNEE SWELLING: ICD-10-CM

## 2023-02-23 PROCEDURE — 99213 OFFICE O/P EST LOW 20 MIN: CPT | Performed by: FAMILY MEDICINE

## 2023-02-23 RX ORDER — PREDNISONE 20 MG/1
TABLET ORAL
Qty: 11 TABLET | Refills: 0 | Status: SHIPPED | OUTPATIENT
Start: 2023-02-23 | End: 2023-03-12

## 2023-02-23 ASSESSMENT — FIBROSIS 4 INDEX: FIB4 SCORE: 1.19

## 2023-02-23 ASSESSMENT — ENCOUNTER SYMPTOMS: FEVER: 0

## 2023-02-24 ENCOUNTER — HOSPITAL ENCOUNTER (OUTPATIENT)
Dept: LAB | Facility: MEDICAL CENTER | Age: 38
End: 2023-02-24
Attending: FAMILY MEDICINE
Payer: COMMERCIAL

## 2023-02-24 DIAGNOSIS — M25.469 KNEE SWELLING: ICD-10-CM

## 2023-02-24 LAB
BASOPHILS # BLD AUTO: 1.8 % (ref 0–1.8)
BASOPHILS # BLD: 0.24 K/UL (ref 0–0.12)
EOSINOPHIL # BLD AUTO: 0.59 K/UL (ref 0–0.51)
EOSINOPHIL NFR BLD: 4.4 % (ref 0–6.9)
ERYTHROCYTE [DISTWIDTH] IN BLOOD BY AUTOMATED COUNT: 41.1 FL (ref 35.9–50)
HCT VFR BLD AUTO: 46.7 % (ref 42–52)
HGB BLD-MCNC: 16 G/DL (ref 14–18)
LYMPHOCYTES # BLD AUTO: 4.03 K/UL (ref 1–4.8)
LYMPHOCYTES NFR BLD: 30.1 % (ref 22–41)
MANUAL DIFF BLD: NORMAL
MCH RBC QN AUTO: 31.6 PG (ref 27–33)
MCHC RBC AUTO-ENTMCNC: 34.3 G/DL (ref 33.7–35.3)
MCV RBC AUTO: 92.3 FL (ref 81.4–97.8)
MONOCYTES # BLD AUTO: 0.83 K/UL (ref 0–0.85)
MONOCYTES NFR BLD AUTO: 6.2 % (ref 0–13.4)
MORPHOLOGY BLD-IMP: NORMAL
MYELOCYTES NFR BLD MANUAL: 3.5 %
NEUTROPHILS # BLD AUTO: 7.24 K/UL (ref 1.82–7.42)
NEUTROPHILS NFR BLD: 53.1 % (ref 44–72)
NEUTS BAND NFR BLD MANUAL: 0.9 % (ref 0–10)
NRBC # BLD AUTO: 0 K/UL
NRBC BLD-RTO: 0 /100 WBC
PLATELET # BLD AUTO: 237 K/UL (ref 164–446)
PLATELET BLD QL SMEAR: NORMAL
PMV BLD AUTO: 9.1 FL (ref 9–12.9)
RBC # BLD AUTO: 5.06 M/UL (ref 4.7–6.1)
RBC BLD AUTO: NORMAL
URATE SERPL-MCNC: 6.1 MG/DL (ref 2.5–8.3)
WBC # BLD AUTO: 13.4 K/UL (ref 4.8–10.8)

## 2023-02-24 PROCEDURE — 84550 ASSAY OF BLOOD/URIC ACID: CPT

## 2023-02-24 PROCEDURE — 85007 BL SMEAR W/DIFF WBC COUNT: CPT

## 2023-02-24 PROCEDURE — 36415 COLL VENOUS BLD VENIPUNCTURE: CPT

## 2023-02-24 PROCEDURE — 85025 COMPLETE CBC W/AUTO DIFF WBC: CPT

## 2023-02-24 NOTE — PROGRESS NOTES
Subjective:     Jose Miguel Luna is a 37 y.o. male who presents for Gout (Left knee gout flare up )    HPI  Pt presents for evaluation of an acute problem  Patient with new left knee pain  Pain started without fall or injury   Pain is moderate and not improving   Knee feels swollen  No erythema of the knee  Has history of multiple episodes of toe swelling which started atraumatically and are presumed to likely be gout  Has never had a uric acid test done that he knows of    Review of Systems   Constitutional:  Negative for fever.   Skin:  Negative for rash.     PMH:  has no past medical history on file.  MEDS:   Current Outpatient Medications:     predniSONE (DELTASONE) 20 MG Tab, Take 2 tabs (40mg) daily x 3 days, then take 1 tab (20mg) daily x 3 days, then take 1/2 tab (10mg) daily x 4 days, Disp: 11 Tablet, Rfl: 0  ALLERGIES: No Known Allergies  SURGHX: History reviewed. No pertinent surgical history.  SOCHX:  reports that he has quit smoking. His smoking use included cigarettes. He has never used smokeless tobacco. He reports that he does not currently use alcohol after a past usage of about 4.2 oz per week. He reports current drug use. Drugs: Marijuana, Inhaled, and Oral.     Objective:   /88   Pulse 95   Temp 36.7 °C (98.1 °F) (Temporal)   Resp 14   Ht 1.829 m (6')   Wt (!) 128 kg (283 lb)   SpO2 95%   BMI 38.38 kg/m²     Physical Exam  Constitutional:       General: He is not in acute distress.     Appearance: He is well-developed. He is not diaphoretic.   Pulmonary:      Effort: Pulmonary effort is normal.   Neurological:      Mental Status: He is alert.   Left knee with mild swelling and slight warmth compared to contralateral side.  No overlying erythema or bruising.  Marked tenderness along the joint line.    Assessment/Plan:   Assessment    1. Knee swelling  - predniSONE (DELTASONE) 20 MG Tab; Take 2 tabs (40mg) daily x 3 days, then take 1 tab (20mg) daily x 3 days, then take 1/2 tab  (10mg) daily x 4 days  Dispense: 11 Tablet; Refill: 0  - CBC WITH DIFFERENTIAL; Future  - URIC ACID; Future    Patient with knee swelling which is likely due to gout.  Has never had uric acid tested and recommended doing test during his acute flare.  Start prednisone, and will have lab draw CBC and uric acid to further evaluate.

## 2023-03-12 ENCOUNTER — OFFICE VISIT (OUTPATIENT)
Dept: URGENT CARE | Facility: PHYSICIAN GROUP | Age: 38
End: 2023-03-12
Payer: COMMERCIAL

## 2023-03-12 VITALS
WEIGHT: 276 LBS | RESPIRATION RATE: 18 BRPM | TEMPERATURE: 98.2 F | HEART RATE: 112 BPM | HEIGHT: 72 IN | BODY MASS INDEX: 37.38 KG/M2 | OXYGEN SATURATION: 94 % | DIASTOLIC BLOOD PRESSURE: 84 MMHG | SYSTOLIC BLOOD PRESSURE: 132 MMHG

## 2023-03-12 DIAGNOSIS — Z11.52 ENCOUNTER FOR SCREENING FOR COVID-19: ICD-10-CM

## 2023-03-12 DIAGNOSIS — J02.9 ACUTE PHARYNGITIS, UNSPECIFIED ETIOLOGY: ICD-10-CM

## 2023-03-12 DIAGNOSIS — R11.10 VOMITING, UNSPECIFIED VOMITING TYPE, UNSPECIFIED WHETHER NAUSEA PRESENT: ICD-10-CM

## 2023-03-12 LAB
FLUAV RNA SPEC QL NAA+PROBE: NEGATIVE
FLUBV RNA SPEC QL NAA+PROBE: NEGATIVE
RSV RNA SPEC QL NAA+PROBE: NEGATIVE
S PYO DNA SPEC NAA+PROBE: NOT DETECTED
SARS-COV-2 RNA RESP QL NAA+PROBE: NEGATIVE

## 2023-03-12 PROCEDURE — 0241U POCT CEPHEID COV-2, FLU A/B, RSV - PCR: CPT | Performed by: FAMILY MEDICINE

## 2023-03-12 PROCEDURE — 99214 OFFICE O/P EST MOD 30 MIN: CPT | Mod: CS | Performed by: FAMILY MEDICINE

## 2023-03-12 PROCEDURE — 87651 STREP A DNA AMP PROBE: CPT | Performed by: FAMILY MEDICINE

## 2023-03-12 RX ORDER — ONDANSETRON 2 MG/ML
6 INJECTION INTRAMUSCULAR; INTRAVENOUS ONCE
Status: COMPLETED | OUTPATIENT
Start: 2023-03-12 | End: 2023-03-12

## 2023-03-12 RX ORDER — ONDANSETRON 4 MG/1
TABLET, ORALLY DISINTEGRATING ORAL
Qty: 15 TABLET | Refills: 0 | Status: SHIPPED | OUTPATIENT
Start: 2023-03-12 | End: 2023-12-25

## 2023-03-12 RX ADMIN — ONDANSETRON 6 MG: 2 INJECTION INTRAMUSCULAR; INTRAVENOUS at 15:39

## 2023-03-12 ASSESSMENT — FIBROSIS 4 INDEX: FIB4 SCORE: 1.39

## 2023-03-12 NOTE — LETTER
March 12, 2023         Patient: Jose Miguel Luna   YOB: 1985   Date of Visit: 3/12/2023           To Whom it May Concern:    Jose Miguel Luna was seen in my clinic on 3/12/2023.     Please excuse from work for 3/13/23 thru and including 3/15/23 due to medical condition.     May return sooner if feeling better.    If you have any questions or concerns, please don't hesitate to call.        Sincerely,           Gennaro Wells M.D.  Electronically Signed

## 2023-06-21 ENCOUNTER — APPOINTMENT (OUTPATIENT)
Dept: RADIOLOGY | Facility: MEDICAL CENTER | Age: 38
End: 2023-06-21
Attending: EMERGENCY MEDICINE
Payer: MEDICAID

## 2023-06-21 ENCOUNTER — HOSPITAL ENCOUNTER (EMERGENCY)
Facility: MEDICAL CENTER | Age: 38
End: 2023-06-21
Attending: EMERGENCY MEDICINE
Payer: MEDICAID

## 2023-06-21 ENCOUNTER — APPOINTMENT (OUTPATIENT)
Dept: RADIOLOGY | Facility: MEDICAL CENTER | Age: 38
End: 2023-06-21
Payer: MEDICAID

## 2023-06-21 VITALS
SYSTOLIC BLOOD PRESSURE: 151 MMHG | RESPIRATION RATE: 18 BRPM | BODY MASS INDEX: 41.03 KG/M2 | OXYGEN SATURATION: 96 % | HEART RATE: 68 BPM | TEMPERATURE: 98 F | HEIGHT: 72 IN | WEIGHT: 302.91 LBS | DIASTOLIC BLOOD PRESSURE: 85 MMHG

## 2023-06-21 DIAGNOSIS — R10.13 EPIGASTRIC ABDOMINAL PAIN: ICD-10-CM

## 2023-06-21 DIAGNOSIS — K62.5 RECTAL BLEEDING: ICD-10-CM

## 2023-06-21 LAB
ABO GROUP BLD: NORMAL
ALBUMIN SERPL BCP-MCNC: 4.2 G/DL (ref 3.2–4.9)
ALBUMIN/GLOB SERPL: 1.1 G/DL
ALP SERPL-CCNC: 77 U/L (ref 30–99)
ALT SERPL-CCNC: 41 U/L (ref 2–50)
ANION GAP SERPL CALC-SCNC: 12 MMOL/L (ref 7–16)
APPEARANCE UR: CLEAR
APTT PPP: 30.7 SEC (ref 24.7–36)
AST SERPL-CCNC: 32 U/L (ref 12–45)
BASOPHILS # BLD AUTO: 0.9 % (ref 0–1.8)
BASOPHILS # BLD: 0.09 K/UL (ref 0–0.12)
BILIRUB SERPL-MCNC: 0.9 MG/DL (ref 0.1–1.5)
BILIRUB UR QL STRIP.AUTO: NEGATIVE
BLD GP AB SCN SERPL QL: NORMAL
BUN SERPL-MCNC: 11 MG/DL (ref 8–22)
CALCIUM ALBUM COR SERPL-MCNC: 9.4 MG/DL (ref 8.5–10.5)
CALCIUM SERPL-MCNC: 9.6 MG/DL (ref 8.5–10.5)
CHLORIDE SERPL-SCNC: 103 MMOL/L (ref 96–112)
CO2 SERPL-SCNC: 24 MMOL/L (ref 20–33)
COLOR UR: YELLOW
CREAT SERPL-MCNC: 0.69 MG/DL (ref 0.5–1.4)
EOSINOPHIL # BLD AUTO: 0.35 K/UL (ref 0–0.51)
EOSINOPHIL NFR BLD: 3.3 % (ref 0–6.9)
ERYTHROCYTE [DISTWIDTH] IN BLOOD BY AUTOMATED COUNT: 39.9 FL (ref 35.9–50)
GFR SERPLBLD CREATININE-BSD FMLA CKD-EPI: 122 ML/MIN/1.73 M 2
GLOBULIN SER CALC-MCNC: 4 G/DL (ref 1.9–3.5)
GLUCOSE SERPL-MCNC: 90 MG/DL (ref 65–99)
GLUCOSE UR STRIP.AUTO-MCNC: NEGATIVE MG/DL
HCT VFR BLD AUTO: 41.9 % (ref 42–52)
HGB BLD-MCNC: 14.8 G/DL (ref 14–18)
IMM GRANULOCYTES # BLD AUTO: 0.04 K/UL (ref 0–0.11)
IMM GRANULOCYTES NFR BLD AUTO: 0.4 % (ref 0–0.9)
INR PPP: 1.13 (ref 0.87–1.13)
KETONES UR STRIP.AUTO-MCNC: NEGATIVE MG/DL
LEUKOCYTE ESTERASE UR QL STRIP.AUTO: NEGATIVE
LIPASE SERPL-CCNC: 39 U/L (ref 11–82)
LYMPHOCYTES # BLD AUTO: 3.19 K/UL (ref 1–4.8)
LYMPHOCYTES NFR BLD: 30.4 % (ref 22–41)
MCH RBC QN AUTO: 31.4 PG (ref 27–33)
MCHC RBC AUTO-ENTMCNC: 35.3 G/DL (ref 32.3–36.5)
MCV RBC AUTO: 89 FL (ref 81.4–97.8)
MICRO URNS: NORMAL
MONOCYTES # BLD AUTO: 0.87 K/UL (ref 0–0.85)
MONOCYTES NFR BLD AUTO: 8.3 % (ref 0–13.4)
NEUTROPHILS # BLD AUTO: 5.94 K/UL (ref 1.82–7.42)
NEUTROPHILS NFR BLD: 56.7 % (ref 44–72)
NITRITE UR QL STRIP.AUTO: NEGATIVE
NRBC # BLD AUTO: 0 K/UL
NRBC BLD-RTO: 0 /100 WBC (ref 0–0.2)
PH UR STRIP.AUTO: 5.5 [PH] (ref 5–8)
PLATELET # BLD AUTO: 312 K/UL (ref 164–446)
PMV BLD AUTO: 8.4 FL (ref 9–12.9)
POTASSIUM SERPL-SCNC: 4.1 MMOL/L (ref 3.6–5.5)
PROT SERPL-MCNC: 8.2 G/DL (ref 6–8.2)
PROT UR QL STRIP: NEGATIVE MG/DL
PROTHROMBIN TIME: 14.3 SEC (ref 12–14.6)
RBC # BLD AUTO: 4.71 M/UL (ref 4.7–6.1)
RBC UR QL AUTO: NEGATIVE
RH BLD: NORMAL
SODIUM SERPL-SCNC: 139 MMOL/L (ref 135–145)
SP GR UR STRIP.AUTO: 1.02
UROBILINOGEN UR STRIP.AUTO-MCNC: 1 MG/DL
WBC # BLD AUTO: 10.5 K/UL (ref 4.8–10.8)

## 2023-06-21 PROCEDURE — 86900 BLOOD TYPING SEROLOGIC ABO: CPT

## 2023-06-21 PROCEDURE — 80053 COMPREHEN METABOLIC PANEL: CPT

## 2023-06-21 PROCEDURE — A9270 NON-COVERED ITEM OR SERVICE: HCPCS | Performed by: EMERGENCY MEDICINE

## 2023-06-21 PROCEDURE — 85025 COMPLETE CBC W/AUTO DIFF WBC: CPT

## 2023-06-21 PROCEDURE — 74177 CT ABD & PELVIS W/CONTRAST: CPT

## 2023-06-21 PROCEDURE — 81003 URINALYSIS AUTO W/O SCOPE: CPT

## 2023-06-21 PROCEDURE — 36415 COLL VENOUS BLD VENIPUNCTURE: CPT

## 2023-06-21 PROCEDURE — 83690 ASSAY OF LIPASE: CPT

## 2023-06-21 PROCEDURE — 71045 X-RAY EXAM CHEST 1 VIEW: CPT

## 2023-06-21 PROCEDURE — 85610 PROTHROMBIN TIME: CPT

## 2023-06-21 PROCEDURE — 85730 THROMBOPLASTIN TIME PARTIAL: CPT

## 2023-06-21 PROCEDURE — 99284 EMERGENCY DEPT VISIT MOD MDM: CPT

## 2023-06-21 PROCEDURE — 700102 HCHG RX REV CODE 250 W/ 637 OVERRIDE(OP): Performed by: EMERGENCY MEDICINE

## 2023-06-21 PROCEDURE — 86850 RBC ANTIBODY SCREEN: CPT

## 2023-06-21 PROCEDURE — 86901 BLOOD TYPING SEROLOGIC RH(D): CPT

## 2023-06-21 PROCEDURE — 700117 HCHG RX CONTRAST REV CODE 255: Mod: UD | Performed by: EMERGENCY MEDICINE

## 2023-06-21 RX ORDER — SUCRALFATE 1 G/1
1 TABLET ORAL
Qty: 28 TABLET | Refills: 0 | Status: SHIPPED | OUTPATIENT
Start: 2023-06-21 | End: 2023-06-28

## 2023-06-21 RX ORDER — OMEPRAZOLE 20 MG/1
20 CAPSULE, DELAYED RELEASE ORAL DAILY
Qty: 10 CAPSULE | Refills: 0 | Status: SHIPPED | OUTPATIENT
Start: 2023-06-21 | End: 2023-07-01

## 2023-06-21 RX ADMIN — LIDOCAINE HYDROCHLORIDE 30 ML: 20 SOLUTION OROPHARYNGEAL at 12:37

## 2023-06-21 RX ADMIN — IOHEXOL 100 ML: 350 INJECTION, SOLUTION INTRAVENOUS at 13:48

## 2023-06-21 ASSESSMENT — FIBROSIS 4 INDEX: FIB4 SCORE: 1.39

## 2023-06-21 ASSESSMENT — LIFESTYLE VARIABLES: DO YOU DRINK ALCOHOL: YES

## 2023-06-21 NOTE — DISCHARGE INSTRUCTIONS
Return for shortness of breath, dizziness, skin pallor, severe bleeding or any concerns.  Follow-up with gastroenterologist.  See your primary doctor for recheck soon as possible

## 2023-06-21 NOTE — ED TRIAGE NOTES
Jose Miguel Luna  37 y.o. male  Chief Complaint   Patient presents with    Lower GI Bleed     Intermittent bloody stool x several months with increased frequency x 2 weeks. Last night onset of bright red blood with no stool. Bloody BM x 3 since last night.     Abdominal Pain     Generalized intermittent stabbing abdominal pain 8/10        Pt ambulatory to triage with steady gait for above complaint.     Pt is GCS 15, speaking in full sentences, follows commands and responds appropriately to questions. Resp are even and unlabored.     Abdominal pain and GI bleeding protocol ordered. Pt placed in draw room. Pt educated on triage process. Pt encouraged to alert staff for any changes.     Vitals:    06/21/23 1058   BP: (!) 160/97   Pulse: 67   Resp: 18   Temp: 36.1 °C (97 °F)   SpO2: 98%

## 2023-06-21 NOTE — ED NOTES
Ambulatory to room, agree with triage note. Drinks 1 pint/day. Admits to some upper GI bleeding several weeks ago. Changed into gown, CXR at bedside. Chart up for ERP.    Number Of Epidermal Sutures (Optional): 20

## 2023-08-23 ENCOUNTER — DOCUMENTATION (OUTPATIENT)
Dept: HEALTH INFORMATION MANAGEMENT | Facility: OTHER | Age: 38
End: 2023-08-23
Payer: MEDICAID

## 2023-09-01 ENCOUNTER — NON-PROVIDER VISIT (OUTPATIENT)
Dept: URGENT CARE | Facility: PHYSICIAN GROUP | Age: 38
End: 2023-09-01

## 2023-09-01 DIAGNOSIS — Z02.1 PRE-EMPLOYMENT DRUG SCREENING: ICD-10-CM

## 2023-09-01 LAB
AMP AMPHETAMINE: NORMAL
COC COCAINE: NORMAL
INT CON NEG: NORMAL
INT CON POS: NORMAL
MET METHAMPHETAMINES: NORMAL
OPI OPIATES: NORMAL
PCP PHENCYCLIDINE: NORMAL
POC DRUG COMMENT 753798-OCCUPATIONAL HEALTH: NORMAL
THC: NORMAL

## 2023-09-01 PROCEDURE — 80305 DRUG TEST PRSMV DIR OPT OBS: CPT | Performed by: STUDENT IN AN ORGANIZED HEALTH CARE EDUCATION/TRAINING PROGRAM

## 2023-09-15 ENCOUNTER — OFFICE VISIT (OUTPATIENT)
Dept: URGENT CARE | Facility: PHYSICIAN GROUP | Age: 38
End: 2023-09-15
Payer: MEDICAID

## 2023-09-15 VITALS
TEMPERATURE: 97 F | RESPIRATION RATE: 18 BRPM | HEART RATE: 92 BPM | BODY MASS INDEX: 40.42 KG/M2 | SYSTOLIC BLOOD PRESSURE: 132 MMHG | HEIGHT: 73 IN | WEIGHT: 305 LBS | OXYGEN SATURATION: 96 % | DIASTOLIC BLOOD PRESSURE: 86 MMHG

## 2023-09-15 DIAGNOSIS — M10.071 ACUTE IDIOPATHIC GOUT INVOLVING TOE OF RIGHT FOOT: ICD-10-CM

## 2023-09-15 DIAGNOSIS — T14.8XXA SPLINTER IN SKIN: ICD-10-CM

## 2023-09-15 PROCEDURE — 10121 INC&RMVL FB SUBQ TISS COMP: CPT | Performed by: PHYSICIAN ASSISTANT

## 2023-09-15 PROCEDURE — 3079F DIAST BP 80-89 MM HG: CPT | Performed by: PHYSICIAN ASSISTANT

## 2023-09-15 PROCEDURE — 3075F SYST BP GE 130 - 139MM HG: CPT | Performed by: PHYSICIAN ASSISTANT

## 2023-09-15 PROCEDURE — 99214 OFFICE O/P EST MOD 30 MIN: CPT | Mod: 25 | Performed by: PHYSICIAN ASSISTANT

## 2023-09-15 RX ORDER — PREDNISONE 20 MG/1
TABLET ORAL
Qty: 11 TABLET | Refills: 0 | Status: SHIPPED | OUTPATIENT
Start: 2023-09-15 | End: 2023-12-25

## 2023-09-15 ASSESSMENT — FIBROSIS 4 INDEX: FIB4 SCORE: 0.59

## 2023-09-22 NOTE — PROGRESS NOTES
"Subjective     Jose Miguel Luna is a 38 y.o. male who presents with Foreign Body in Skin (Left thumb splinter x 4 days ) and Gout (Great toe. Right foot. Out of meds. )            HPI  Retained splinter in left thumb for 4 days.  There is an area of exquisite soft tissue TTP swelling with underlying pus.  He did remove a large piece but he believes there is a retained foreign body.  No erythema or systemic symptoms.    Patient also requesting refill of prednisone for his recurrent gout.  He has had been having a flare for several days.   pain is in the right great toe.  There is no injury or precipitating event.        PMH:  has no past medical history on file.  MEDS:   Current Outpatient Medications:     predniSONE (DELTASONE) 20 MG Tab, Take 2 tabs (40mg) daily x 3 days, then take 1 tab (20mg) daily x 3 days, then take 1/2 tab (10mg) daily x 4 days, Disp: 11 Tablet, Rfl: 0    ondansetron (ZOFRAN ODT) 4 MG TABLET DISPERSIBLE, 1 TAB, ALLOW TO DISINTEGRATE IN MOUTH, EVERY 6 HOURS ONLY IF NEEDED FOR NAUSEA OR VOMITING. (Patient not taking: Reported on 9/15/2023), Disp: 15 Tablet, Rfl: 0  ALLERGIES: No Known Allergies  SURGHX: No past surgical history on file.  SOCHX:  reports that he has quit smoking. His smoking use included cigarettes. He has never used smokeless tobacco. He reports that he does not currently use alcohol. He reports current drug use. Drugs: Marijuana, Inhaled, and Oral.  FH: family history is not on file.    ROS           Objective     /86   Pulse 92   Temp 36.1 °C (97 °F) (Temporal)   Resp 18   Ht 1.854 m (6' 1\")   Wt (!) 138 kg (305 lb)   SpO2 96%   BMI 40.24 kg/m²      Physical Exam  Vitals and nursing note reviewed.   Constitutional:       General: He is not in acute distress.     Appearance: Normal appearance. He is well-developed. He is not diaphoretic.   HENT:      Head: Normocephalic.      Right Ear: Hearing and external ear normal.      Left Ear: Hearing and external ear " normal.      Nose: Nose normal.      Mouth/Throat:      Mouth: Mucous membranes are moist.   Eyes:      General:         Right eye: No discharge.         Left eye: No discharge.      Conjunctiva/sclera: Conjunctivae normal.   Cardiovascular:      Rate and Rhythm: Normal rate and regular rhythm.   Pulmonary:      Effort: Pulmonary effort is normal. No respiratory distress.      Breath sounds: Normal breath sounds.   Musculoskeletal:      Cervical back: Normal range of motion and neck supple.      Comments: Swelling TTP and erythema of right great toe.  No bony tenderness open lesions or discharge.   Skin:     General: Skin is warm and dry.      Comments: Likely would foreign body and left thumb   Neurological:      General: No focal deficit present.      Mental Status: He is alert and oriented to person, place, and time. Mental status is at baseline.   Psychiatric:         Mood and Affect: Mood normal.         Behavior: Behavior normal.         Thought Content: Thought content normal.         Judgment: Judgment normal.                    Procedure: Skin Foreign Body Removal   -Risks, benefits, and alternatives discussed. Risks including bleeding, nerve damage, infection, and poor cosmetic outcome discussed. Verbal consent was obtained.  -Sterile technique throughout  -Local anesthesia using 1% lidocaine  -Small wood foreign body removed with splinter forceps after several minutes of thorough investigation and debridement  -No active bleeding after removal with minimal blood loss during procedure  -Patient tolerated well  -Wound care discussed  -Signs and symptoms of infection reiterated           Assessment & Plan     Very pleasant 38-year-old male presenting for retained wood foreign body in his left thumb x4 days.  He did remove a large piece but believes there is a retained foreign body given the amount of pain, swelling and discomfort.  There is no systemic symptoms or bony tenderness.  Vitals normal.  Exam shows  tenderness and swelling near the area of the presumed foreign body.  Procedure was performed and a small wood foreign body was extracted.  Procedure took significant mount of time with thorough investigation and debridement.  Patient did tolerate well.  He will continue warm soaks and wound care.  Watch out for signs of infection.    Acute gout flare of his right great toe.  No injury or precipitating event.  No signs of infection.  Refill of prednisone given.    1. Splinter in skin        2. Acute idiopathic gout involving toe of right foot  predniSONE (DELTASONE) 20 MG Tab          I personally reviewed prior external notes and test results pertinent to today's visit. Return to clinic or go to ED if symptoms worsen or persist. Red flag symptoms and indications for ED discussed at length. Patient/Parent/Guardian voices understanding.  AVS with post-visit instructions provided or given verbally.  Follow-up with your primary care provider in 3-5 days. All side effects and potential interactions of prescribed medication discussed including allergic response, GI upset, tendon injury, rash, sedation, OCP effectiveness, etc.    Please note that this dictation was created using voice recognition software. I have made every reasonable attempt to correct obvious errors, but I expect that there are errors of grammar and possibly content that I did not discover before finalizing the note.

## 2023-12-25 ENCOUNTER — OFFICE VISIT (OUTPATIENT)
Dept: URGENT CARE | Facility: PHYSICIAN GROUP | Age: 38
End: 2023-12-25
Payer: MEDICAID

## 2023-12-25 VITALS
HEART RATE: 111 BPM | WEIGHT: 305 LBS | OXYGEN SATURATION: 97 % | SYSTOLIC BLOOD PRESSURE: 132 MMHG | BODY MASS INDEX: 41.31 KG/M2 | HEIGHT: 72 IN | TEMPERATURE: 97 F | DIASTOLIC BLOOD PRESSURE: 74 MMHG | RESPIRATION RATE: 18 BRPM
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
## 2023-12-25 PROCEDURE — 3078F DIAST BP <80 MM HG: CPT | Performed by: PHYSICIAN ASSISTANT

## 2023-12-25 PROCEDURE — 3075F SYST BP GE 130 - 139MM HG: CPT | Performed by: PHYSICIAN ASSISTANT

## 2023-12-25 PROCEDURE — 99213 OFFICE O/P EST LOW 20 MIN: CPT | Performed by: PHYSICIAN ASSISTANT

## 2023-12-25 RX ORDER — AMOXICILLIN AND CLAVULANATE POTASSIUM 875; 125 MG/1; MG/1
1 TABLET, FILM COATED ORAL 2 TIMES DAILY
Qty: 14 TABLET | Refills: 0 | Status: SHIPPED | OUTPATIENT
Start: 2023-12-25 | End: 2024-01-01

## 2023-12-25 ASSESSMENT — ENCOUNTER SYMPTOMS
FEVER: 0
EYE PAIN: 0
DIAPHORESIS: 0
SINUS PAIN: 0
SORE THROAT: 0
HEADACHES: 0
WHEEZING: 0
EYE REDNESS: 0
EYE DISCHARGE: 0
CHILLS: 0
SHORTNESS OF BREATH: 0
DIZZINESS: 0
COUGH: 0

## 2023-12-25 ASSESSMENT — FIBROSIS 4 INDEX: FIB4 SCORE: 0.59
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
## 2024-04-16 ENCOUNTER — OFFICE VISIT (OUTPATIENT)
Dept: URGENT CARE | Facility: PHYSICIAN GROUP | Age: 39
End: 2024-04-16
Payer: COMMERCIAL

## 2024-04-16 VITALS
HEIGHT: 72 IN | OXYGEN SATURATION: 97 % | RESPIRATION RATE: 16 BRPM | TEMPERATURE: 98.6 F | SYSTOLIC BLOOD PRESSURE: 160 MMHG | DIASTOLIC BLOOD PRESSURE: 108 MMHG | BODY MASS INDEX: 40.23 KG/M2 | HEART RATE: 96 BPM | WEIGHT: 297 LBS

## 2024-04-16 DIAGNOSIS — R51.9 NONINTRACTABLE HEADACHE, UNSPECIFIED CHRONICITY PATTERN, UNSPECIFIED HEADACHE TYPE: ICD-10-CM

## 2024-04-16 DIAGNOSIS — R03.0 ELEVATED BLOOD PRESSURE READING: ICD-10-CM

## 2024-04-16 PROCEDURE — 99215 OFFICE O/P EST HI 40 MIN: CPT | Performed by: FAMILY MEDICINE

## 2024-04-16 PROCEDURE — 3077F SYST BP >= 140 MM HG: CPT | Performed by: FAMILY MEDICINE

## 2024-04-16 PROCEDURE — 3080F DIAST BP >= 90 MM HG: CPT | Performed by: FAMILY MEDICINE

## 2024-04-16 ASSESSMENT — FIBROSIS 4 INDEX: FIB4 SCORE: 0.59

## 2024-04-17 NOTE — PROGRESS NOTES
Subjective:      38 y.o. male presents to urgent care for headaches that have been present for about 3 weeks. There was no inciting event or trauma at that time. The pain is intermittent, it's located across his forehead, it's described as throbbing, currently rated 8/10. He is using ibuprofen with good relief in symptoms. No associated change vision. No noise or light sensitivity.     Blood pressure is elevated today in urgent care.  He does not have a history of hypertension.  He denies chest pain, palpitations, or shortness of breath.    Headache red flags  -Recent head trauma: no  -History of exposure to [place], alcohol, drugs: yes. He typically drinks about one pint of vodka daily, he stopped cold turkey 3 days ago  -Headaches that awake patient from sleep: no  -Associated change in vision: no  -On anticoagulant therapy: no  -Lack of coordination: no  -Associated neurologic abnormalities (altered mental status, seizure): no  -New onset of headache over the age of 50 years old: no  -Rapid onset of headache with strenuous exercise: no  -Rapid increase in headache frequency: no  -Immunocompromised (Current diagnosis of cancer, HIV): no    He denies any other questions or concerns at this time.    Current problem list, medication, and past medical/surgical history were reviewed in Epic.    ROS  See HPI     Objective:      BP (!) 160/108   Pulse 96   Temp 37 °C (98.6 °F) (Temporal)   Resp 16   Ht 1.829 m (6')   Wt (!) 135 kg (297 lb)   SpO2 97%   BMI 40.28 kg/m²     Physical Exam  Constitutional:       General: He is not in acute distress.     Appearance: He is not diaphoretic.   HENT:      Head: Normocephalic and atraumatic.   Eyes:      General:         Right eye: No discharge.         Left eye: No discharge.      Extraocular Movements: Extraocular movements intact.      Conjunctiva/sclera: Conjunctivae normal.      Pupils: Pupils are equal, round, and reactive to light.   Cardiovascular:      Rate and  Rhythm: Normal rate and regular rhythm.      Heart sounds: Normal heart sounds.   Pulmonary:      Effort: Pulmonary effort is normal. No respiratory distress.      Breath sounds: Normal breath sounds.   Neurological:      Mental Status: He is alert and oriented to person, place, and time. Mental status is at baseline.      Comments: Cranial nerves II through XII grossly intact.  Equal strength and sensation to extremities x 4.  Normal rapid alternating hand movement.  Normal gait.   Psychiatric:         Mood and Affect: Affect normal.         Judgment: Judgment normal.       Assessment/Plan:     1. Elevated blood pressure reading  2. Nonintractable headache, unspecified chronicity pattern, unspecified headache type  Blood pressure is elevated and he is symptomatic with a headache. Hypertensive emergency vs urgency. At this time, I feel the patient requires a higher level of care in the ED for closer monitoring, stat lab work and/or imaging for further evaluation. This has been discussed with the patient and he states agreement and understanding. The patient is stable (symptoms have been present for 3 weeks) without the need for continuous monitoring and therefore will go via private vehicle to [facility] without delay.      Instructed to return to Urgent Care or nearest Emergency Department if symptoms fail to improve, for any change in condition, further concerns, or new concerning symptoms. Patient states understanding of the plan of care and discharge instructions.    Isabelle Castellon M.D.

## 2024-09-30 ENCOUNTER — HOSPITAL ENCOUNTER (EMERGENCY)
Facility: MEDICAL CENTER | Age: 39
End: 2024-09-30
Attending: EMERGENCY MEDICINE
Payer: COMMERCIAL

## 2024-09-30 VITALS
HEIGHT: 72 IN | SYSTOLIC BLOOD PRESSURE: 158 MMHG | OXYGEN SATURATION: 94 % | DIASTOLIC BLOOD PRESSURE: 77 MMHG | HEART RATE: 95 BPM | WEIGHT: 298.28 LBS | RESPIRATION RATE: 16 BRPM | BODY MASS INDEX: 40.4 KG/M2 | TEMPERATURE: 98.6 F

## 2024-09-30 DIAGNOSIS — R74.8 ELEVATED LIVER ENZYMES: ICD-10-CM

## 2024-09-30 DIAGNOSIS — R20.2 PARESTHESIAS: ICD-10-CM

## 2024-09-30 LAB
ALBUMIN SERPL BCP-MCNC: 4.3 G/DL (ref 3.2–4.9)
ALBUMIN/GLOB SERPL: 1 G/DL
ALP SERPL-CCNC: 101 U/L (ref 30–99)
ALT SERPL-CCNC: 52 U/L (ref 2–50)
ANION GAP SERPL CALC-SCNC: 16 MMOL/L (ref 7–16)
AST SERPL-CCNC: 60 U/L (ref 12–45)
BASOPHILS # BLD AUTO: 0.4 % (ref 0–1.8)
BASOPHILS # BLD: 0.05 K/UL (ref 0–0.12)
BILIRUB SERPL-MCNC: 1.8 MG/DL (ref 0.1–1.5)
BUN SERPL-MCNC: 10 MG/DL (ref 8–22)
CALCIUM ALBUM COR SERPL-MCNC: 9.7 MG/DL (ref 8.5–10.5)
CALCIUM SERPL-MCNC: 9.9 MG/DL (ref 8.5–10.5)
CHLORIDE SERPL-SCNC: 102 MMOL/L (ref 96–112)
CO2 SERPL-SCNC: 22 MMOL/L (ref 20–33)
CREAT SERPL-MCNC: 0.63 MG/DL (ref 0.5–1.4)
EOSINOPHIL # BLD AUTO: 0.11 K/UL (ref 0–0.51)
EOSINOPHIL NFR BLD: 0.9 % (ref 0–6.9)
ERYTHROCYTE [DISTWIDTH] IN BLOOD BY AUTOMATED COUNT: 41.4 FL (ref 35.9–50)
GFR SERPLBLD CREATININE-BSD FMLA CKD-EPI: 125 ML/MIN/1.73 M 2
GLOBULIN SER CALC-MCNC: 4.3 G/DL (ref 1.9–3.5)
GLUCOSE SERPL-MCNC: 108 MG/DL (ref 65–99)
HCT VFR BLD AUTO: 44.6 % (ref 42–52)
HGB BLD-MCNC: 15.6 G/DL (ref 14–18)
IMM GRANULOCYTES # BLD AUTO: 0.03 K/UL (ref 0–0.11)
IMM GRANULOCYTES NFR BLD AUTO: 0.2 % (ref 0–0.9)
LYMPHOCYTES # BLD AUTO: 3.51 K/UL (ref 1–4.8)
LYMPHOCYTES NFR BLD: 29.2 % (ref 22–41)
MCH RBC QN AUTO: 31.1 PG (ref 27–33)
MCHC RBC AUTO-ENTMCNC: 35 G/DL (ref 32.3–36.5)
MCV RBC AUTO: 89 FL (ref 81.4–97.8)
MONOCYTES # BLD AUTO: 1.02 K/UL (ref 0–0.85)
MONOCYTES NFR BLD AUTO: 8.5 % (ref 0–13.4)
NEUTROPHILS # BLD AUTO: 7.32 K/UL (ref 1.82–7.42)
NEUTROPHILS NFR BLD: 60.8 % (ref 44–72)
NRBC # BLD AUTO: 0 K/UL
NRBC BLD-RTO: 0 /100 WBC (ref 0–0.2)
PLATELET # BLD AUTO: 210 K/UL (ref 164–446)
PMV BLD AUTO: 8.6 FL (ref 9–12.9)
POTASSIUM SERPL-SCNC: 3.6 MMOL/L (ref 3.6–5.5)
PROT SERPL-MCNC: 8.6 G/DL (ref 6–8.2)
RBC # BLD AUTO: 5.01 M/UL (ref 4.7–6.1)
SODIUM SERPL-SCNC: 140 MMOL/L (ref 135–145)
WBC # BLD AUTO: 12 K/UL (ref 4.8–10.8)

## 2024-09-30 PROCEDURE — 85025 COMPLETE CBC W/AUTO DIFF WBC: CPT

## 2024-09-30 PROCEDURE — 80053 COMPREHEN METABOLIC PANEL: CPT

## 2024-09-30 PROCEDURE — 36415 COLL VENOUS BLD VENIPUNCTURE: CPT

## 2024-09-30 PROCEDURE — 99284 EMERGENCY DEPT VISIT MOD MDM: CPT

## 2024-09-30 PROCEDURE — 0241U HCHG SARS-COV-2 COVID-19 NFCT DS RESP RNA 4 TRGT ED POC: CPT

## 2024-09-30 RX ORDER — PREDNISONE 10 MG/1
10 TABLET ORAL DAILY
Qty: 4 TABLET | Refills: 0 | Status: SHIPPED | OUTPATIENT
Start: 2024-09-30 | End: 2024-10-04

## 2024-09-30 ASSESSMENT — FIBROSIS 4 INDEX: FIB4 SCORE: 0.61

## 2024-10-01 LAB
FLUAV RNA SPEC QL NAA+PROBE: NEGATIVE
FLUBV RNA SPEC QL NAA+PROBE: NEGATIVE
RSV RNA SPEC QL NAA+PROBE: NEGATIVE
SARS-COV-2 RNA RESP QL NAA+PROBE: NOTDETECTED

## 2024-11-02 ENCOUNTER — OFFICE VISIT (OUTPATIENT)
Dept: URGENT CARE | Facility: PHYSICIAN GROUP | Age: 39
End: 2024-11-02
Payer: COMMERCIAL

## 2024-11-02 VITALS
BODY MASS INDEX: 40.16 KG/M2 | OXYGEN SATURATION: 98 % | TEMPERATURE: 96.7 F | HEART RATE: 98 BPM | HEIGHT: 73 IN | DIASTOLIC BLOOD PRESSURE: 98 MMHG | WEIGHT: 303 LBS | RESPIRATION RATE: 18 BRPM | SYSTOLIC BLOOD PRESSURE: 130 MMHG

## 2024-11-02 DIAGNOSIS — M10.9 ACUTE GOUT INVOLVING TOE OF RIGHT FOOT, UNSPECIFIED CAUSE: ICD-10-CM

## 2024-11-02 PROCEDURE — 99213 OFFICE O/P EST LOW 20 MIN: CPT | Performed by: PHYSICIAN ASSISTANT

## 2024-11-02 PROCEDURE — 3080F DIAST BP >= 90 MM HG: CPT | Performed by: PHYSICIAN ASSISTANT

## 2024-11-02 PROCEDURE — 3075F SYST BP GE 130 - 139MM HG: CPT | Performed by: PHYSICIAN ASSISTANT

## 2024-11-02 RX ORDER — PREDNISONE 10 MG/1
TABLET ORAL
Qty: 20 TABLET | Refills: 0 | Status: SHIPPED | OUTPATIENT
Start: 2024-11-02

## 2024-11-02 ASSESSMENT — FIBROSIS 4 INDEX: FIB4 SCORE: 1.505614818325621903

## 2024-11-02 NOTE — PROGRESS NOTES
"Chief Complaint   Patient presents with    Toe Pain     Possible gout flare up       HISTORY OF PRESENT ILLNESS: Patient is a 39 y.o. male who presents today because toe pain.  He states that he had something similar to this a year ago around this time.  He attributes it to his diet and likely cheese and beer.  He does plan to establish with a PCP for better management.  He states his pain started Friday and involves his big toe joint with pain proximally to midfoot.  He describes exquisite tenderness with walking and hot to touch.    Patient Active Problem List    Diagnosis Date Noted    Flank pain 09/12/2022    Transaminitis 04/11/2022       Allergies:Patient has no known allergies.    No current Epic-ordered outpatient medications on file.     No current Epic-ordered facility-administered medications on file.       History reviewed. No pertinent past medical history.    Social History     Tobacco Use    Smoking status: Former     Types: Cigarettes    Smokeless tobacco: Never   Vaping Use    Vaping status: Never Used   Substance Use Topics    Alcohol use: Yes     Comment: 1 pt/day whiskey or vodka    Drug use: Not Currently     Types: Marijuana, Inhaled, Oral     Comment: marijuana 7/week        No family status information on file.   History reviewed. No pertinent family history.    Review of Systems   Musculoskeletal:  Positive for joint pain.   All other systems reviewed and are negative.       Exam:  BP (!) 130/98 (BP Location: Left arm, Patient Position: Sitting, BP Cuff Size: Large adult)   Pulse 98   Temp 35.9 °C (96.7 °F)   Resp 18   Ht 1.854 m (6' 1\")   Wt (!) 137 kg (303 lb)   SpO2 98%     Nursing note and vitals reviewed.    Constitutional:  Appropriately groomed, pleasant affect, well nourished, and in no acute distress.    HEENT:  Head: Atraumatic, normocephalic.    Ears:  Hearing grossly intact to voice.    Eyes:  Conjunctivae clear, sclera white, and medial canthus without exudate " bilaterally.    Lungs:  Lungs with normal respiratory excursion and effort.       Right Foot:  No edema, mild erythema to great big toe with warmth and exquisite tenderness to palpation.   No ttp over ATFL, deltoid ligaments, med/lat malleoulus, achilles tendon, or metatarsals.      FROM: Dorsiflexion, plantar flexion, inversion, eversion.      Motor Examination: Dorsi/plantar flexion 5/5 without atrophy.  Negative Perez's.       Sensation equal bilaterally to light touch for L4, L5, and S1.       NVS intact, DP 2+ bilaterally.  Gait and station wnl, non antalgic.    Derm:  No rashes or lesions with good turgor pressure.     Psychiatric:  Normal judgement, mood and affect.    Please note that this dictation was created using voice recognition software. I have made every reasonable attempt to correct obvious errors, but I expect that there are errors of grammar and possibly content that I did not discover before finalizing the note.    Assessment/Plan:  1. Acute gout involving toe of right foot, unspecified cause  predniSONE (DELTASONE) 10 MG Tab      Patient presents with suspected gout flare involving the right toe.  Plan to treat with prednisone pulse/taper.  We discussed cleaning up his diet and cutting out trigger foods such as beer and excess cheese and shrimp and pork.  Also advised him to establish with a PCP.    Instructed to return to Urgent Care or nearest Emergency Department if symptoms fail to improve, for any change in condition, further concerns, or new concerning symptoms. Patient states understanding of the plan of care and discharge instructions.    Jerry Hathaway PA-C

## 2024-12-03 ENCOUNTER — OFFICE VISIT (OUTPATIENT)
Dept: URGENT CARE | Facility: CLINIC | Age: 39
End: 2024-12-03
Payer: COMMERCIAL

## 2024-12-03 ENCOUNTER — HOSPITAL ENCOUNTER (EMERGENCY)
Facility: MEDICAL CENTER | Age: 39
End: 2024-12-03
Attending: EMERGENCY MEDICINE
Payer: COMMERCIAL

## 2024-12-03 VITALS
HEIGHT: 72 IN | HEART RATE: 86 BPM | TEMPERATURE: 97.8 F | RESPIRATION RATE: 20 BRPM | OXYGEN SATURATION: 97 % | WEIGHT: 304 LBS | SYSTOLIC BLOOD PRESSURE: 142 MMHG | DIASTOLIC BLOOD PRESSURE: 88 MMHG | BODY MASS INDEX: 41.17 KG/M2

## 2024-12-03 VITALS
SYSTOLIC BLOOD PRESSURE: 139 MMHG | RESPIRATION RATE: 13 BRPM | TEMPERATURE: 98 F | HEART RATE: 68 BPM | WEIGHT: 304.24 LBS | HEIGHT: 72 IN | DIASTOLIC BLOOD PRESSURE: 75 MMHG | BODY MASS INDEX: 41.21 KG/M2 | OXYGEN SATURATION: 97 %

## 2024-12-03 DIAGNOSIS — Z86.39 HISTORY OF ELEVATED GLUCOSE: ICD-10-CM

## 2024-12-03 DIAGNOSIS — E11.65 TYPE 2 DIABETES MELLITUS WITH HYPERGLYCEMIA, WITHOUT LONG-TERM CURRENT USE OF INSULIN (HCC): ICD-10-CM

## 2024-12-03 DIAGNOSIS — R73.9 ACUTE HYPERGLYCEMIA: ICD-10-CM

## 2024-12-03 DIAGNOSIS — R73.9 HYPERGLYCEMIA: ICD-10-CM

## 2024-12-03 LAB
ALBUMIN SERPL BCP-MCNC: 4.3 G/DL (ref 3.2–4.9)
ALBUMIN/GLOB SERPL: 1 G/DL
ALP SERPL-CCNC: 115 U/L (ref 30–99)
ALT SERPL-CCNC: 35 U/L (ref 2–50)
ANION GAP SERPL CALC-SCNC: 12 MMOL/L (ref 7–16)
AST SERPL-CCNC: 21 U/L (ref 12–45)
B-OH-BUTYR SERPL-MCNC: 0.1 MMOL/L (ref 0.02–0.27)
BASOPHILS # BLD AUTO: 0.4 % (ref 0–1.8)
BASOPHILS # BLD: 0.04 K/UL (ref 0–0.12)
BILIRUB SERPL-MCNC: 0.8 MG/DL (ref 0.1–1.5)
BUN SERPL-MCNC: 16 MG/DL (ref 8–22)
CALCIUM ALBUM COR SERPL-MCNC: 9.7 MG/DL (ref 8.5–10.5)
CALCIUM SERPL-MCNC: 9.9 MG/DL (ref 8.4–10.2)
CHLORIDE SERPL-SCNC: 98 MMOL/L (ref 96–112)
CO2 SERPL-SCNC: 23 MMOL/L (ref 20–33)
CREAT SERPL-MCNC: 0.73 MG/DL (ref 0.5–1.4)
EOSINOPHIL # BLD AUTO: 0.22 K/UL (ref 0–0.51)
EOSINOPHIL NFR BLD: 2.1 % (ref 0–6.9)
ERYTHROCYTE [DISTWIDTH] IN BLOOD BY AUTOMATED COUNT: 36.5 FL (ref 35.9–50)
GFR SERPLBLD CREATININE-BSD FMLA CKD-EPI: 119 ML/MIN/1.73 M 2
GLOBULIN SER CALC-MCNC: 4.3 G/DL (ref 1.9–3.5)
GLUCOSE BLD STRIP.AUTO-MCNC: 406 MG/DL (ref 65–99)
GLUCOSE BLD-MCNC: 436 MG/DL (ref 65–99)
GLUCOSE SERPL-MCNC: 395 MG/DL (ref 65–99)
HCG SERPL QL: NEGATIVE
HCT VFR BLD AUTO: 44 % (ref 42–52)
HGB BLD-MCNC: 15.6 G/DL (ref 14–18)
IMM GRANULOCYTES # BLD AUTO: 0.03 K/UL (ref 0–0.11)
IMM GRANULOCYTES NFR BLD AUTO: 0.3 % (ref 0–0.9)
LYMPHOCYTES # BLD AUTO: 3.76 K/UL (ref 1–4.8)
LYMPHOCYTES NFR BLD: 35.6 % (ref 22–41)
MCH RBC QN AUTO: 30.5 PG (ref 27–33)
MCHC RBC AUTO-ENTMCNC: 35.5 G/DL (ref 32.3–36.5)
MCV RBC AUTO: 86.1 FL (ref 81.4–97.8)
MONOCYTES # BLD AUTO: 0.66 K/UL (ref 0–0.85)
MONOCYTES NFR BLD AUTO: 6.3 % (ref 0–13.4)
NEUTROPHILS # BLD AUTO: 5.85 K/UL (ref 1.82–7.42)
NEUTROPHILS NFR BLD: 55.3 % (ref 44–72)
NRBC # BLD AUTO: 0 K/UL
NRBC BLD-RTO: 0 /100 WBC (ref 0–0.2)
PLATELET # BLD AUTO: 318 K/UL (ref 164–446)
PMV BLD AUTO: 8.6 FL (ref 9–12.9)
POTASSIUM SERPL-SCNC: 4.5 MMOL/L (ref 3.6–5.5)
PROT SERPL-MCNC: 8.6 G/DL (ref 6–8.2)
RBC # BLD AUTO: 5.11 M/UL (ref 4.7–6.1)
SODIUM SERPL-SCNC: 133 MMOL/L (ref 135–145)
WBC # BLD AUTO: 10.6 K/UL (ref 4.8–10.8)

## 2024-12-03 PROCEDURE — 82962 GLUCOSE BLOOD TEST: CPT

## 2024-12-03 PROCEDURE — 700105 HCHG RX REV CODE 258: Performed by: EMERGENCY MEDICINE

## 2024-12-03 PROCEDURE — 83036 HEMOGLOBIN GLYCOSYLATED A1C: CPT

## 2024-12-03 PROCEDURE — 82010 KETONE BODYS QUAN: CPT

## 2024-12-03 PROCEDURE — 80053 COMPREHEN METABOLIC PANEL: CPT

## 2024-12-03 PROCEDURE — 84703 CHORIONIC GONADOTROPIN ASSAY: CPT

## 2024-12-03 PROCEDURE — 99215 OFFICE O/P EST HI 40 MIN: CPT

## 2024-12-03 PROCEDURE — 99284 EMERGENCY DEPT VISIT MOD MDM: CPT

## 2024-12-03 PROCEDURE — 36415 COLL VENOUS BLD VENIPUNCTURE: CPT

## 2024-12-03 PROCEDURE — 85025 COMPLETE CBC W/AUTO DIFF WBC: CPT

## 2024-12-03 RX ORDER — SODIUM CHLORIDE 9 MG/ML
1000 INJECTION, SOLUTION INTRAVENOUS ONCE
Status: COMPLETED | OUTPATIENT
Start: 2024-12-03 | End: 2024-12-03

## 2024-12-03 RX ORDER — IBUPROFEN 200 MG
400 TABLET ORAL DAILY
COMMUNITY

## 2024-12-03 RX ADMIN — SODIUM CHLORIDE 1000 ML: 9 INJECTION, SOLUTION INTRAVENOUS at 16:02

## 2024-12-03 ASSESSMENT — FIBROSIS 4 INDEX
FIB4 SCORE: 1.505614818325621903
FIB4 SCORE: 1.505614818325621903

## 2024-12-03 NOTE — ED PROVIDER NOTES
ER Provider Note    Scribed for  Jonh Schreiber D.O. by Eden Freed. 12/3/2024   3:39 PM    Primary Care Provider: Pcp Pt States None    CHIEF COMPLAINT  Chief Complaint   Patient presents with    High Blood Sugar     States BS >500       EXTERNAL RECORDS REVIEWED  Outpatient Notes outpatient behavioral health.    HPI/ROS  LIMITATION TO HISTORY   Select: : None  OUTSIDE HISTORIAN(S):  None    Jose Miguel Luna is a 39 y.o. male who presents to the ED for evaluation of high blood sugar onset of unknown duration. Patient reports that he was doing a physical for a new job with a UA. The UA showed some sugar in his urine and the finger prick also showed high sugar. Patient was then recommended to present to Urgent Care. His blood sugar was originally in the 570s at the job physical but was down in the 430s at Urgent Care. After evaluation at Urgent Care, the patient was then advised to present to the ED. Patient added that he has a history of alcohol abuse and received a DUI. As he is cutting back from alcohol, he has been eating more. Patient noted that he has been voiding more. He has not received a vaccination in a while. He endorses a family history of type II diabetes.     Patient was treated with a steroid taper for gout 1 month ago.    PAST MEDICAL HISTORY  History reviewed. No pertinent past medical history.    SURGICAL HISTORY  History reviewed. No pertinent surgical history.    FAMILY HISTORY  History reviewed. No pertinent family history.    SOCIAL HISTORY   reports that he has quit smoking. His smoking use included cigarettes. He has never used smokeless tobacco. He reports current alcohol use. He reports that he does not currently use drugs.    CURRENT MEDICATIONS  Previous Medications    PREDNISONE (DELTASONE) 10 MG TAB    Take 4 tablets PO x 2 days, then 3 tablets PO x 2 days, then 2 tablets PO x 2 days, then 1 tablet PO x 2 days       ALLERGIES  No Known Allergies     PHYSICAL EXAM  BP (!) 168/94    Pulse 83   Temp 36.7 °C (98 °F) (Temporal)   Resp 15   Ht 1.829 m (6')   Wt (!) 138 kg (304 lb 3.8 oz)   SpO2 92%   BMI 41.26 kg/m²    General: No acute distress, Obese.  Kind.  Neuro: Awake alert and oriented, muscle strength sensation normal  Neck: Supple  Cardiac: Regular rate and rhythm  Pulmonary: Clear to auscultation bilaterally no distress  Abdomen: Soft nontender nondistended  Back: Nontender  Psych: Normal  Skin: Pink warm dry, normal skin turgor  Extremities: Full range of motion, muscle strength sensation intact 2+ pulses    DIAGNOSTIC STUDIES/PROCEDURES  Labs:   Labs Reviewed   CBC WITH DIFFERENTIAL - Abnormal; Notable for the following components:       Result Value    MPV 8.6 (*)     All other components within normal limits   COMP METABOLIC PANEL - Abnormal; Notable for the following components:    Sodium 133 (*)     Glucose 395 (*)     Alkaline Phosphatase 115 (*)     Total Protein 8.6 (*)     Globulin 4.3 (*)     All other components within normal limits   POCT GLUCOSE DEVICE RESULTS - Abnormal; Notable for the following components:    POC Glucose, Blood 406 (*)     All other components within normal limits   HCG QUAL SERUM   ESTIMATED GFR   BETA-HYDROXYBUTYRIC ACID     I have independently interpreted the above labs    COURSE & MEDICAL DECISION MAKING     Hydration: Based on the patient's presentation of Hyperglycemia the patient was given IV fluids. IV Hydration was used because oral hydration was not adequate alone. Upon recheck following hydration, the patient was improved.    INITIAL ASSESSMENT, COURSE AND PLAN  Differential diagnoses include but not limited to: Type One Diabetes vs  Type Two Diabetes vs DKA.     Care Narrative: Patient presents with high blood sugar onset this morning. He adds that his blood sugar was initially found to be over 500 and repeat blood sugar was later found to be in the 400s.     3:39 PM - Patient was first seen and evaluated at bedside. Patient presents  to the ED for high blood sugar onset this morning. Ordered for Beta-HCG Qualitative Serum, CMP, CBC w/ Diff, Beta-Hydroxybutyric Acid and Hemoglobin A1C to evaluate. The patient will be medicated with NS 1,000 mL infusion for his symptoms. Patient verbalizes understanding and support with my plan of care.     4:11 PM - The patient's labs are reassuring. He was informed of the plan for discharge with Metformin and life style changes. Patient will seek medical attention for new or worsening symptoms. He was allowed to ask questions and agrees to the plan of care.     ED course and additional problems:    Patient presents because he is found to have elevated blood glucose on screening physical for a job.  He does have elevated blood sugar here without ketosis.  Without dehydration without renal failure.  We spent greater than 15 minutes educating the patient on lifestyle corrections to include diet exercise and nutrition.  Patient was also given 1 L of crystalloid.  This all could be likely type 2 diabetes and exacerbated by a recent steroid taper.  Patient was prescribed metformin.  A referral for PCP was established and patient agrees to follow-up with the primary care within the next 2 weeks.  He understands return if anything worsens.  Of note hemodynamically stable moist mucous membranes.    DISPOSITION AND DISCUSSIONS    I have discussed management of the patient with the following physicians and ANDREW's:  None    Discussion of management with other QHP or appropriate source(s): None     Escalation of care considered, and ultimately not performed: acute inpatient care management, however at this time, the patient is most appropriate for outpatient management.    Barriers to care at this time, including but not limited to: Patient does not have established PCP.     Decision tools and prescription drugs considered including, but not limited to:  Metformin .    The patient will return for new or worsening symptoms and  is stable at the time of discharge.    The patient is referred to a primary physician for blood pressure management, diabetic screening, and for all other preventative health concerns.    DISPOSITION:  Patient will be discharged home in stable condition.    FOLLOW UP:  No follow-up provider specified.    OUTPATIENT MEDICATIONS:  Discharge Medication List as of 12/3/2024  6:02 PM        START taking these medications    Details   metFORMIN (GLUCOPHAGE) 500 MG Tab Take 1 Tablet by mouth 2 times a day with meals for 30 days., Disp-60 Tablet, R-0, Normal              FINAL DIAGNOSIS  1. Hyperglycemia    2. Type 2 diabetes mellitus with hyperglycemia, without long-term current use of insulin (HCC)       Eden CORONADO (Aliyahibbehzad), am scribing for, and in the presence of, Jonh Schreiber D.O..    Electronically signed by: Eden Freed (Steven), 12/3/2024    Jonh CORONADO D.O. personally performed the services described in this documentation, as scribed by Eden Freed in my presence, and it is both accurate and complete.      The note accurately reflects work and decisions made by me.  Jonh Schreiber D.O.  12/3/2024  9:47 PM

## 2024-12-03 NOTE — PROGRESS NOTES
CHIEF COMPLAINT  Chief Complaint   Patient presents with    Elevated Glucose     Patient was seen today at the [facility] for a physical and blood sugars 578. Patient was advised to go to an Urgent care. Patient states he feels normal.      Subjective:   Jose Miguel Luna is a 39 y.o. male who presents to urgent care with concerns for elevated blood sugar.  Patient was receiving a work physical and on exam fingerstick blood sugar was 578.  Patient was told to follow-up to urgent care.  Patient does report noticing over the last month and increase in urinary frequency.  He does report a pertinent family history of type 2 diabetes.  Patient reports he has only had a banana and water this morning to eat.  No other pertinent past medical history.     Review of Systems   Genitourinary:  Positive for frequency.       PAST MEDICAL HISTORY  Patient Active Problem List    Diagnosis Date Noted    Flank pain 09/12/2022    Transaminitis 04/11/2022       SURGICAL HISTORY  patient denies any surgical history    ALLERGIES  No Known Allergies    CURRENT MEDICATIONS  Home Medications    **Home medications have not yet been reviewed for this encounter**         SOCIAL HISTORY  Social History     Tobacco Use    Smoking status: Former     Types: Cigarettes    Smokeless tobacco: Never   Vaping Use    Vaping status: Never Used   Substance and Sexual Activity    Alcohol use: Yes    Drug use: Not Currently     Comment: denies    Sexual activity: Yes     Partners: Female     Birth control/protection: None     Comment: multiple        FAMILY HISTORY  No family history on file.      Medications, Allergies, and current problem list reviewed today in Epic.     Objective:     BP (!) 142/88   Pulse 86   Temp 36.6 °C (97.8 °F) (Temporal)   Resp 20   Ht 1.829 m (6')   Wt (!) 138 kg (304 lb)   SpO2 97%     Physical Exam  Vitals reviewed.   Constitutional:       General: He is not in acute distress.     Appearance: Normal appearance. He is  normal weight. He is not ill-appearing or toxic-appearing.   HENT:      Head: Normocephalic.   Cardiovascular:      Rate and Rhythm: Normal rate and regular rhythm.      Pulses: Normal pulses.      Heart sounds: Normal heart sounds.   Pulmonary:      Effort: Pulmonary effort is normal. No respiratory distress.      Breath sounds: Normal breath sounds. No stridor. No wheezing, rhonchi or rales.   Musculoskeletal:      Cervical back: Neck supple.   Skin:     General: Skin is warm.      Capillary Refill: Capillary refill takes less than 2 seconds.   Neurological:      General: No focal deficit present.      Mental Status: He is alert.   Psychiatric:         Mood and Affect: Mood normal.         Lab Results/POC Test Results   Results for orders placed or performed in visit on 12/03/24   POCT glucose    Collection Time: 12/03/24  1:18 PM   Result Value Ref Range    Glucose - Accu-Ck 436 (A) 65 - 99 mg/dL          Assessment/Plan:     Diagnosis and associated orders:     1. Acute hyperglycemia  POCT glucose      2. History of elevated glucose           Comments/MDM:     Patient reports fingerstick blood sugar at physical was 578.  Fingerstick blood sugar today in clinic of 436.  Given elevation of glucose  I do think it is prudent for patient to follow-up to ER for further labs and diagnostics.  Discussed management with patient.  Patient verbalized understanding is agreeable plan. Will report to ER without delay.          Differential diagnosis, natural history, supportive care, and indications for immediate follow-up discussed.    Advised the patient to follow-up with the primary care physician for recheck, reevaluation, and consideration of further management.    Please note that this dictation was created using voice recognition software. I have made a reasonable attempt to correct obvious errors, but I expect that there are errors of grammar and possibly content that I did not discover before finalizing the  note.    This note was electronically signed by RYAN Carrillo

## 2024-12-03 NOTE — ED TRIAGE NOTES
Chief Complaint   Patient presents with    High Blood Sugar     States BS >500       BP (!) 168/94   Pulse 83   Temp 36.7 °C (98 °F) (Temporal)   Resp 15   Ht 1.829 m (6')   Wt (!) 138 kg (304 lb 3.8 oz)   SpO2 92%   BMI 41.26 kg/m²     Pt ambulated to ED by self, states was going through extensive physical for new job, including blood work.  Pt was told glucose lvls were high, sent to , BS > 500, sent to ED.    Current .    Pt denies hx of DM, states mother does have DM, denies N/V, denies c/o abd pain.  Pt states had a banana this am, had plans to go out for Sushi w/ a friend for lunch.

## 2024-12-04 LAB
EST. AVERAGE GLUCOSE BLD GHB EST-MCNC: 197 MG/DL
HBA1C MFR BLD: 8.5 % (ref 4–5.6)

## 2024-12-04 NOTE — DISCHARGE INSTRUCTIONS
As discussed.  Animal products and some fruits and vegetables.  Stay away from processed foods or soda or sugary drinks.  Increase exercise.  Take medication as prescribed.  Follow-up with the primary care, we sent a referral to establish a primary care doctor.  Follow-up within the next 1 to 2 weeks.  Keep a log of your blood glucose in the morning.  Stay hydrated.  Return to the emergency department with any concerns.

## 2024-12-04 NOTE — ED NOTES
Dc instructions and medications discussed with patient at bedside. All questions answered at this time. VSS. No IV in place at this time. Pt to lobby without incident. Self to drive patient home.

## 2024-12-04 NOTE — ED NOTES
Medication history reviewed with pt. Med rec is complete.  Allergies reviewed, per pt    Pt reports that he did start taking his PREDNISONE 10MG on 11/2/2024, but only took for 4 days and stopped.    Patient has not had any outpatient antibiotics in the last 30 days.    Pt is not on any anticoagulants

## 2024-12-20 ENCOUNTER — OFFICE VISIT (OUTPATIENT)
Dept: MEDICAL GROUP | Facility: PHYSICIAN GROUP | Age: 39
End: 2024-12-20
Payer: COMMERCIAL

## 2024-12-20 ENCOUNTER — HOSPITAL ENCOUNTER (OUTPATIENT)
Facility: MEDICAL CENTER | Age: 39
End: 2024-12-20
Payer: COMMERCIAL

## 2024-12-20 VITALS
SYSTOLIC BLOOD PRESSURE: 130 MMHG | WEIGHT: 298.28 LBS | TEMPERATURE: 99.6 F | OXYGEN SATURATION: 94 % | HEIGHT: 72 IN | BODY MASS INDEX: 40.4 KG/M2 | DIASTOLIC BLOOD PRESSURE: 84 MMHG | HEART RATE: 92 BPM | RESPIRATION RATE: 16 BRPM

## 2024-12-20 DIAGNOSIS — E11.9 TYPE 2 DIABETES MELLITUS WITHOUT COMPLICATION, WITHOUT LONG-TERM CURRENT USE OF INSULIN (HCC): ICD-10-CM

## 2024-12-20 PROBLEM — M10.9 GOUT: Status: ACTIVE | Noted: 2024-12-20

## 2024-12-20 PROCEDURE — 82570 ASSAY OF URINE CREATININE: CPT

## 2024-12-20 PROCEDURE — 99214 OFFICE O/P EST MOD 30 MIN: CPT

## 2024-12-20 PROCEDURE — 82043 UR ALBUMIN QUANTITATIVE: CPT

## 2024-12-20 SDOH — ECONOMIC STABILITY: HOUSING INSECURITY
IN THE LAST 12 MONTHS, WAS THERE A TIME WHEN YOU DID NOT HAVE A STEADY PLACE TO SLEEP OR SLEPT IN A SHELTER (INCLUDING NOW)?: YES

## 2024-12-20 SDOH — HEALTH STABILITY: PHYSICAL HEALTH: ON AVERAGE, HOW MANY DAYS PER WEEK DO YOU ENGAGE IN MODERATE TO STRENUOUS EXERCISE (LIKE A BRISK WALK)?: 5 DAYS

## 2024-12-20 SDOH — ECONOMIC STABILITY: TRANSPORTATION INSECURITY
IN THE PAST 12 MONTHS, HAS LACK OF TRANSPORTATION KEPT YOU FROM MEETINGS, WORK, OR FROM GETTING THINGS NEEDED FOR DAILY LIVING?: NO

## 2024-12-20 SDOH — ECONOMIC STABILITY: INCOME INSECURITY: HOW HARD IS IT FOR YOU TO PAY FOR THE VERY BASICS LIKE FOOD, HOUSING, MEDICAL CARE, AND HEATING?: SOMEWHAT HARD

## 2024-12-20 SDOH — HEALTH STABILITY: PHYSICAL HEALTH: ON AVERAGE, HOW MANY MINUTES DO YOU ENGAGE IN EXERCISE AT THIS LEVEL?: 150+ MIN

## 2024-12-20 SDOH — ECONOMIC STABILITY: FOOD INSECURITY: WITHIN THE PAST 12 MONTHS, YOU WORRIED THAT YOUR FOOD WOULD RUN OUT BEFORE YOU GOT MONEY TO BUY MORE.: NEVER TRUE

## 2024-12-20 SDOH — HEALTH STABILITY: MENTAL HEALTH
STRESS IS WHEN SOMEONE FEELS TENSE, NERVOUS, ANXIOUS, OR CAN'T SLEEP AT NIGHT BECAUSE THEIR MIND IS TROUBLED. HOW STRESSED ARE YOU?: VERY MUCH

## 2024-12-20 SDOH — ECONOMIC STABILITY: FOOD INSECURITY: WITHIN THE PAST 12 MONTHS, THE FOOD YOU BOUGHT JUST DIDN'T LAST AND YOU DIDN'T HAVE MONEY TO GET MORE.: NEVER TRUE

## 2024-12-20 SDOH — ECONOMIC STABILITY: INCOME INSECURITY: IN THE LAST 12 MONTHS, WAS THERE A TIME WHEN YOU WERE NOT ABLE TO PAY THE MORTGAGE OR RENT ON TIME?: YES

## 2024-12-20 SDOH — ECONOMIC STABILITY: TRANSPORTATION INSECURITY
IN THE PAST 12 MONTHS, HAS LACK OF RELIABLE TRANSPORTATION KEPT YOU FROM MEDICAL APPOINTMENTS, MEETINGS, WORK OR FROM GETTING THINGS NEEDED FOR DAILY LIVING?: NO

## 2024-12-20 SDOH — ECONOMIC STABILITY: TRANSPORTATION INSECURITY
IN THE PAST 12 MONTHS, HAS THE LACK OF TRANSPORTATION KEPT YOU FROM MEDICAL APPOINTMENTS OR FROM GETTING MEDICATIONS?: NO

## 2024-12-20 ASSESSMENT — SOCIAL DETERMINANTS OF HEALTH (SDOH)
HOW HARD IS IT FOR YOU TO PAY FOR THE VERY BASICS LIKE FOOD, HOUSING, MEDICAL CARE, AND HEATING?: SOMEWHAT HARD
HOW OFTEN DO YOU ATTEND CHURCH OR RELIGIOUS SERVICES?: 1 TO 4 TIMES PER YEAR
IN A TYPICAL WEEK, HOW MANY TIMES DO YOU TALK ON THE PHONE WITH FAMILY, FRIENDS, OR NEIGHBORS?: THREE TIMES A WEEK
HOW OFTEN DO YOU ATTENT MEETINGS OF THE CLUB OR ORGANIZATION YOU BELONG TO?: NEVER
IN THE PAST 12 MONTHS, HAS THE ELECTRIC, GAS, OIL, OR WATER COMPANY THREATENED TO SHUT OFF SERVICE IN YOUR HOME?: NO
HOW OFTEN DO YOU ATTEND CHURCH OR RELIGIOUS SERVICES?: 1 TO 4 TIMES PER YEAR
HOW OFTEN DO YOU ATTENT MEETINGS OF THE CLUB OR ORGANIZATION YOU BELONG TO?: NEVER
DO YOU BELONG TO ANY CLUBS OR ORGANIZATIONS SUCH AS CHURCH GROUPS UNIONS, FRATERNAL OR ATHLETIC GROUPS, OR SCHOOL GROUPS?: NO
HOW OFTEN DO YOU GET TOGETHER WITH FRIENDS OR RELATIVES?: TWICE A WEEK
DO YOU BELONG TO ANY CLUBS OR ORGANIZATIONS SUCH AS CHURCH GROUPS UNIONS, FRATERNAL OR ATHLETIC GROUPS, OR SCHOOL GROUPS?: NO
IN A TYPICAL WEEK, HOW MANY TIMES DO YOU TALK ON THE PHONE WITH FAMILY, FRIENDS, OR NEIGHBORS?: THREE TIMES A WEEK
HOW OFTEN DO YOU GET TOGETHER WITH FRIENDS OR RELATIVES?: TWICE A WEEK
WITHIN THE PAST 12 MONTHS, YOU WORRIED THAT YOUR FOOD WOULD RUN OUT BEFORE YOU GOT THE MONEY TO BUY MORE: NEVER TRUE

## 2024-12-20 ASSESSMENT — ENCOUNTER SYMPTOMS
INSOMNIA: 0
DIARRHEA: 0
CONSTIPATION: 0
BACK PAIN: 0
HEADACHES: 0
COUGH: 0
WEIGHT LOSS: 0
HEARTBURN: 0
DEPRESSION: 0

## 2024-12-20 ASSESSMENT — FIBROSIS 4 INDEX: FIB4 SCORE: 0.42

## 2024-12-20 ASSESSMENT — PATIENT HEALTH QUESTIONNAIRE - PHQ9: CLINICAL INTERPRETATION OF PHQ2 SCORE: 0

## 2024-12-21 DIAGNOSIS — E11.9 TYPE 2 DIABETES MELLITUS WITHOUT COMPLICATION, WITHOUT LONG-TERM CURRENT USE OF INSULIN (HCC): ICD-10-CM

## 2024-12-21 LAB
CREAT UR-MCNC: 96.54 MG/DL
MICROALBUMIN UR-MCNC: 38.7 MG/DL
MICROALBUMIN/CREAT UR: 401 MG/G (ref 0–30)

## 2024-12-21 NOTE — PROGRESS NOTES
Subjective:     CC:  The encounter diagnosis was Type 2 diabetes mellitus without complication, without long-term current use of insulin (HCC).    HISTORY OF THE PRESENT ILLNESS: Patient is a 39 y.o. male. This pleasant patient is here today to establish care and discuss new diabetes diagnosis.  Essentially diagnosed with a hemoglobin A1c of 8.5% on 12/3/2024 after a routine employment physical indicated severe hyperglycemia where he was evaluated in the ED.  Patient remained asymptomatic however he is very anxious about his new diagnosis.  He states that he was previously an alcoholic but received a DUI in October 2024 where he spent several days in [facility] while he went through withdrawals, he states he has not had a drink of alcohol since, however since that time he has been eating a lot more to compensate for cravings.    He continues that he is nervous about his diabetes diagnosis as he has seen his mom go through 29 years of diabetes of which she now has kidney failure and is on dialysis.  He describes his diet as largely as medically [place], which includes a lot of rice and tortillas and otherwise a large source of carbohydrates.    He continues that he is checking his blood sugars at home when it has been ranging between 200 and 400, this is then while he initiated the metformin 500 mg twice daily after his ED discharge.  He denies any symptoms of diabetes outside of frequent urination and denies side effects from the metformin medication.    Problem   Type 2 Diabetes Mellitus (Hcc)    Patient was being evaluated for peripheral employment physical 12/3/2024 when it was noted that he had significant glucosuria, and while at the clinic he had a point-of-care blood glucose that was in the 500s.  He was instructed to go to the ER for severe hyperglycemia, where it was noted he had an hemoglobin A1c of 8.5%, essentially diagnosing him with type 2 diabetes.    He was started on metformin 500 mg twice daily at the  time of ED discharge.         Health Maintenance: Completed    ROS:   Review of Systems   Constitutional:  Negative for weight loss.   Respiratory:  Negative for cough.    Cardiovascular:  Negative for leg swelling.   Gastrointestinal:  Negative for constipation, diarrhea and heartburn.   Genitourinary:  Positive for frequency. Negative for dysuria, hematuria and urgency.   Musculoskeletal:  Negative for back pain.   Neurological:  Negative for headaches.   Psychiatric/Behavioral:  Negative for depression. The patient does not have insomnia.          Objective:     Exam: /84   Pulse 92   Temp 37.6 °C (99.6 °F) (Temporal)   Resp 16   Ht 1.829 m (6')   Wt (!) 135 kg (298 lb 4.5 oz)   SpO2 94%  Body mass index is 40.45 kg/m².    Physical Exam  Constitutional:       Appearance: Normal appearance.   HENT:      Right Ear: Tympanic membrane and ear canal normal.      Left Ear: Tympanic membrane and ear canal normal.      Mouth/Throat:      Mouth: Mucous membranes are moist.      Pharynx: Oropharynx is clear.   Eyes:      Conjunctiva/sclera: Conjunctivae normal.      Pupils: Pupils are equal, round, and reactive to light.   Cardiovascular:      Rate and Rhythm: Normal rate and regular rhythm.   Pulmonary:      Effort: No respiratory distress.      Breath sounds: No wheezing.   Abdominal:      General: There is no distension.      Tenderness: There is no abdominal tenderness. There is no right CVA tenderness or left CVA tenderness.   Musculoskeletal:         General: No swelling.      Cervical back: Normal range of motion.      Right lower leg: No edema.      Left lower leg: No edema.   Skin:     General: Skin is warm and dry.      Capillary Refill: Capillary refill takes less than 2 seconds.      Findings: No rash.   Neurological:      General: No focal deficit present.      Mental Status: He is alert and oriented to person, place, and time.      Deep Tendon Reflexes: Reflexes normal.   Psychiatric:         Mood  and Affect: Mood normal.       Monofilament testing with a 10 gram force: sensation intact: intact bilaterally  Visual Inspection: Feet without maceration, ulcers, fissures.  Pedal pulses: intact bilaterally          Assessment & Plan:   39 y.o. male with the following -    Assessment & Plan  Type 2 diabetes mellitus without complication, without long-term current use of insulin (HCC)  -Chronic, uncontrolled. Patient recently diagnosed with a hemoglobin A1c of 8.5% after severe hypoglycemic episode, though not in DKA not requiring specific treatment.  -Counseled the patient on long-term plan and pharmacotherapy options related to his new diabetes type 2 diagnosis.  -Reassured patient that early intervention and lifestyle along with dietary modifications can help prevent the long-term complications related to type 2 diabetes  -Encouraged patient to continue making healthy choices including the avoidance of alcohol as well as the reduction of carbohydrate rich foods in his diet.  -Relevant laboratory work and follow-up discussed with the patient  -We will increase his metformin in a tapered fashion over the next 2 weeks to 1000 mg twice daily as long as he he does not experience GI side effects.  -Will follow-up with him in 2 months to reassess A1c  -Relevant laboratory work ordered today patient encouraged to complete in a fasting status.  Orders:    HEP C VIRUS ANTIBODY; Future    Lipid Profile; Future    Microalbumin Creat Ratio Urine (Clinic Collect); Future    Comp Metabolic Panel; Future    Diabetic Monofilament LE Exam    Referral for Retinal Screening Exam; Future        Return in about 2 months (around 2/20/2025) for f/u T2DM.    Please note that this dictation was created using voice recognition software. I have made every reasonable attempt to correct obvious errors, but I expect that there are errors of grammar and possibly content that I did not discover before finalizing the note.        Max CHAVEZ  EVANGELINA Cunningham.

## 2024-12-27 ENCOUNTER — APPOINTMENT (OUTPATIENT)
Dept: URGENT CARE | Facility: PHYSICIAN GROUP | Age: 39
End: 2024-12-27
Payer: COMMERCIAL

## 2024-12-30 NOTE — ASSESSMENT & PLAN NOTE
-Chronic, uncontrolled. Patient recently diagnosed with a hemoglobin A1c of 8.5% after severe hypoglycemic episode, though not in DKA not requiring specific treatment.  -Counseled the patient on long-term plan and pharmacotherapy options related to his new diabetes type 2 diagnosis.  -Reassured patient that early intervention and lifestyle along with dietary modifications can help prevent the long-term complications related to type 2 diabetes  -Encouraged patient to continue making healthy choices including the avoidance of alcohol as well as the reduction of carbohydrate rich foods in his diet.  -Relevant laboratory work and follow-up discussed with the patient  -We will increase his metformin in a tapered fashion over the next 2 weeks to 1000 mg twice daily as long as he he does not experience GI side effects.  -Will follow-up with him in 2 months to reassess A1c  -Relevant laboratory work ordered today patient encouraged to complete in a fasting status.  Orders:    HEP C VIRUS ANTIBODY; Future    Lipid Profile; Future    Microalbumin Creat Ratio Urine (Clinic Collect); Future    Comp Metabolic Panel; Future    Diabetic Monofilament LE Exam    Referral for Retinal Screening Exam; Future

## 2025-02-06 NOTE — TELEPHONE ENCOUNTER
Received request via: Patient    Was the patient seen in the last year in this department? Yes    Does the patient have an active prescription (recently filled or refills available) for medication(s) requested? No    Pharmacy Name: [facility]    Does the patient have [facility] and need 100-day supply? (This applies to ALL medications) Patient does not have [facility]

## 2025-02-21 ENCOUNTER — OFFICE VISIT (OUTPATIENT)
Dept: MEDICAL GROUP | Facility: PHYSICIAN GROUP | Age: 40
End: 2025-02-21
Payer: COMMERCIAL

## 2025-02-21 VITALS
TEMPERATURE: 97.9 F | OXYGEN SATURATION: 97 % | RESPIRATION RATE: 14 BRPM | HEIGHT: 72 IN | HEART RATE: 81 BPM | BODY MASS INDEX: 36.82 KG/M2 | SYSTOLIC BLOOD PRESSURE: 128 MMHG | DIASTOLIC BLOOD PRESSURE: 84 MMHG | WEIGHT: 271.83 LBS

## 2025-02-21 DIAGNOSIS — E11.65 TYPE 2 DIABETES MELLITUS WITH HYPERGLYCEMIA, WITHOUT LONG-TERM CURRENT USE OF INSULIN (HCC): ICD-10-CM

## 2025-02-21 DIAGNOSIS — L73.2 HIDRADENITIS SUPPURATIVA: ICD-10-CM

## 2025-02-21 DIAGNOSIS — E66.01 CLASS 2 SEVERE OBESITY DUE TO EXCESS CALORIES WITH SERIOUS COMORBIDITY AND BODY MASS INDEX (BMI) OF 36.0 TO 36.9 IN ADULT (HCC): ICD-10-CM

## 2025-02-21 DIAGNOSIS — E66.812 CLASS 2 SEVERE OBESITY DUE TO EXCESS CALORIES WITH SERIOUS COMORBIDITY AND BODY MASS INDEX (BMI) OF 36.0 TO 36.9 IN ADULT (HCC): ICD-10-CM

## 2025-02-21 PROBLEM — E66.09 CLASS 2 OBESITY DUE TO EXCESS CALORIES WITH BODY MASS INDEX (BMI) OF 36.0 TO 36.9 IN ADULT: Status: ACTIVE | Noted: 2025-02-21

## 2025-02-21 LAB
HBA1C MFR BLD: 12.5 % (ref ?–5.8)
POCT INT CON NEG: NEGATIVE
POCT INT CON POS: POSITIVE

## 2025-02-21 PROCEDURE — 3079F DIAST BP 80-89 MM HG: CPT

## 2025-02-21 PROCEDURE — 92250 FUNDUS PHOTOGRAPHY W/I&R: CPT | Mod: 26

## 2025-02-21 PROCEDURE — 99213 OFFICE O/P EST LOW 20 MIN: CPT

## 2025-02-21 PROCEDURE — 3074F SYST BP LT 130 MM HG: CPT

## 2025-02-21 PROCEDURE — 83036 HEMOGLOBIN GLYCOSYLATED A1C: CPT

## 2025-02-21 RX ORDER — CLINDAMYCIN PHOSPHATE 11.9 MG/ML
1 SOLUTION TOPICAL 2 TIMES DAILY
Qty: 60 ML | Refills: 1 | Status: SHIPPED | OUTPATIENT
Start: 2025-02-21

## 2025-02-21 ASSESSMENT — FIBROSIS 4 INDEX: FIB4 SCORE: 0.42

## 2025-02-21 ASSESSMENT — PATIENT HEALTH QUESTIONNAIRE - PHQ9: CLINICAL INTERPRETATION OF PHQ2 SCORE: 0

## 2025-02-24 LAB — RETINAL SCREEN: NEGATIVE

## 2025-02-24 ASSESSMENT — ENCOUNTER SYMPTOMS
DIARRHEA: 0
HEARTBURN: 0
DEPRESSION: 0
WEIGHT LOSS: 0
NERVOUS/ANXIOUS: 1
CONSTIPATION: 0
BACK PAIN: 0
HEADACHES: 0
INSOMNIA: 0
COUGH: 0

## 2025-02-24 NOTE — TELEPHONE ENCOUNTER
Received request via: Patient    Was the patient seen in the last year in this department? Yes    Does the patient have an active prescription (recently filled or refills available) for medication(s) requested? No    Pharmacy Name: [facility]    Does the patient have MCFP Plus and need 100-day supply? (This applies to ALL medications) Patient does not have SCP

## 2025-02-25 NOTE — ASSESSMENT & PLAN NOTE
-Physical exam consistent with at [facility], likely has a complication of his diabetes.  -Rx sent for Cleocin solution and topical wash.  -Dermatology referral sent today out of concern for possible sinus tracts between several different cysts.  Orders:    Referral to Dermatology

## 2025-02-25 NOTE — ASSESSMENT & PLAN NOTE
-Chronic, ongoing. Patient has lost around 20 pounds in the last month since his type 2 diabetes diagnosis due to lifestyle and dietary changes.  -Applauded patient for effort and attempting to lose weight despite the frustration of his A1c result today.  -Counseled patient on healthy lifestyle and dietary changes that would be beneficial to him.  -Will continue to monitor weight at subsequent visits.

## 2025-02-25 NOTE — PROGRESS NOTES
Subjective:     CC: Follow-up on type 2 diabetes as well as right armpit cyst    HPI:   Jose Miguel presents today for follow-up on his recent type 2 diabetes diagnosis.  Patient was started on metformin in December when his hemoglobin A1c was 8.5.  Since that time patient states he has been consistent with his metformin and denies any sort of adverse or unpleasant side effects.  He is also made considerable dietary and lifestyle changes.  He has lost around 20 pounds.  However, patient expresses some frustration as he often tests his blood sugar at home and it continues to range from 250-400.  He states that on good days he is encouraged if it is in the 200s, which he knows is actually still very bad.  Denies any numbness tingling or neurological issues.    Continues to express a significant amount of anxiety related to this diagnosis as he has seen his mom go through dialysis and kidney failure.  He has a deep desire to get this under control soon as possible.    No problems updated.    Health Maintenance: Completed    ROS:  Review of Systems   Constitutional:  Negative for weight loss.   Respiratory:  Negative for cough.    Cardiovascular:  Negative for leg swelling.   Gastrointestinal:  Negative for constipation, diarrhea and heartburn.   Genitourinary:  Negative for dysuria, frequency and urgency.   Musculoskeletal:  Negative for back pain.   Neurological:  Negative for headaches.   Psychiatric/Behavioral:  Negative for depression. The patient is nervous/anxious. The patient does not have insomnia.        Objective:     Exam:  /84   Pulse 81   Temp 36.6 °C (97.9 °F) (Temporal)   Resp 14   Ht 1.829 m (6')   Wt 123 kg (271 lb 13.2 oz)   SpO2 97%   BMI 36.87 kg/m²  Body mass index is 36.87 kg/m².    Physical Exam  Constitutional:       Appearance: Normal appearance.   HENT:      Right Ear: Tympanic membrane and ear canal normal.      Left Ear: Tympanic membrane and ear canal normal.      Mouth/Throat:       Mouth: Mucous membranes are moist.      Pharynx: Oropharynx is clear.   Eyes:      Conjunctiva/sclera: Conjunctivae normal.      Pupils: Pupils are equal, round, and reactive to light.   Cardiovascular:      Rate and Rhythm: Normal rate and regular rhythm.   Pulmonary:      Effort: No respiratory distress.      Breath sounds: No wheezing.   Abdominal:      General: There is no distension.      Tenderness: There is no abdominal tenderness. There is no right CVA tenderness or left CVA tenderness.   Musculoskeletal:         General: No swelling.      Cervical back: Normal range of motion.      Right lower leg: No edema.      Left lower leg: No edema.   Skin:     General: Skin is warm and dry.      Capillary Refill: Capillary refill takes less than 2 seconds.      Findings: No rash.             Comments: Several open possible sinus tracts in the right axilla associated with tenderness and hyperpigmentation as well slight erythema.  There are 2-3 centimeter tender nodules, likely cysts. No signs of infection not currently draining.  Mildly tender to palpation.   Neurological:      General: No focal deficit present.      Mental Status: He is alert and oriented to person, place, and time.      Deep Tendon Reflexes: Reflexes normal.   Psychiatric:         Mood and Affect: Mood normal.             Labs: Hemoglobin A1c is 12-1/2% in clinic today.    Assessment & Plan:     39 y.o. male with the following -     Assessment & Plan  Type 2 diabetes mellitus with hyperglycemia, without long-term current use of insulin (HCC)  -Chronic, uncontrolled.  Patient continues to state that his home blood glucose levels range and average in the 300s.  -Has not experienced any side effects with metformin 1000 mg twice daily.  -Risks and benefits discussed with the patient in regards to starting a once weekly semaglutide injection.  Will start at initial dosing and follow-up in 1 month to see if patient tolerated this to increase the  dosing.  Patient is agreeable to this plan.  -Due to patient's uncontrolled diabetic status as well as blood glucose levels being reported in the 500s occasionally, and endocrinology referral was placed today.  -Follow-up in 1 month for med adjustment.  -Per orders  Orders:    POCT Hemoglobin A1C    POCT Retinal Eye Exam    Referral to Endocrinology    Class 2 severe obesity due to excess calories with serious comorbidity and body mass index (BMI) of 36.0 to 36.9 in adult (HCC)  -Chronic, ongoing. Patient has lost around 20 pounds in the last month since his type 2 diabetes diagnosis due to lifestyle and dietary changes.  -Applauded patient for effort and attempting to lose weight despite the frustration of his A1c result today.  -Counseled patient on healthy lifestyle and dietary changes that would be beneficial to him.  -Will continue to monitor weight at subsequent visits.       Hidradenitis suppurativa  -Physical exam consistent with at [facility], likely has a complication of his diabetes.  -Rx sent for Cleocin solution and topical wash.  -Dermatology referral sent today out of concern for possible sinus tracts between several different cysts.  Orders:    Referral to Dermatology    Return in about 1 month (around 3/21/2025) for f/u bev COPE if possible .    Please note that this dictation was created using voice recognition software. I have made every reasonable attempt to correct obvious errors, but I expect that there are errors of grammar and possibly content that I did not discover before finalizing the note.      Max Cunningham D.O.

## 2025-02-25 NOTE — ASSESSMENT & PLAN NOTE
-Chronic, uncontrolled.  Patient continues to state that his home blood glucose levels range and average in the 300s.  -Has not experienced any side effects with metformin 1000 mg twice daily.  -Risks and benefits discussed with the patient in regards to starting a once weekly semaglutide injection.  Will start at initial dosing and follow-up in 1 month to see if patient tolerated this to increase the dosing.  Patient is agreeable to this plan.  -Due to patient's uncontrolled diabetic status as well as blood glucose levels being reported in the 500s occasionally, and endocrinology referral was placed today.  -Follow-up in 1 month for med adjustment.  -Per orders  Orders:    POCT Hemoglobin A1C    POCT Retinal Eye Exam    Referral to Endocrinology

## 2025-02-26 NOTE — Clinical Note
REFERRAL APPROVAL NOTICE         Sent on February 26, 2025                   Jose Miguel Luna  [address] [facility] [address]                   Dear Mr. Luna,    After a careful review of the medical information and benefit coverage, [facility] has processed your referral. See below for additional details.    If applicable, you must be actively enrolled with your insurance for coverage of the authorized service. If you have any questions regarding your coverage, please contact your insurance directly.    REFERRAL INFORMATION   Referral #:  [id number]  Referred-To Service Location    Referred-By Provider:  Dermatology    Max Cunningham D.O.   [facility]      [address] [facility] [address]  [phone] [address] [address]  [phone]    Referral Start Date:  02/21/2025  Referral End Date:   02/21/2026             SCHEDULING  If you do not already have an appointment, please call [phone] to make an appointment.     MORE INFORMATION  If you do not already have a [employer] account, sign up at: [email or web address]  You can access your medical information, make appointments, see lab results, billing information, and more.  If you have questions regarding this referral, please contact  the [facility] Referrals department at:             [phone]. Monday - Friday 8:00AM - 5:00PM.     Sincerely,    [facility]    
I have personally performed a face to face diagnostic evaluation on this patient. I have reviewed the ACP note and agree with the history, exam and plan of care, except as noted.

## 2025-02-26 NOTE — Clinical Note
REFERRAL APPROVAL NOTICE         Sent on February 26, 2025                   Jose Miguel Luna  [address] [facility] [address]                   Dear Mr. Luna,    After a careful review of the medical information and benefit coverage, [facility] has processed your referral. See below for additional details.    If applicable, you must be actively enrolled with your insurance for coverage of the authorized service. If you have any questions regarding your coverage, please contact your insurance directly.    REFERRAL INFORMATION   Referral #:  [id number]  Referred-To Department    Referred-By Provider:  Endocrinology    Max Cunningham D.O.   Endocrinology [facility]      [address] [facility] [address] [phone] [address] [facility], [address]  [place] [facility] [address] [phone]    Referral Start Date:  02/21/2025  Referral End Date:   02/21/2026           SCHEDULING  If you do not already have an appointment, please call [phone] to make an appointment.   MORE INFORMATION  As a reminder, [facility] ownership has changed, meaning this location is now owned and operated by [facility]. As such, we want to clarify that our patients should expect to receive two separate bills for the services received at [facility] - one representing the [facility] facility fees as the owner of the establishment, and the other to represent the physician's services and subsequent fees. You can speak with your insurance carrier for a pricing estimate by calling the customer service number on the back of your card and ask about charges for a hospital outpatient visit.  If you do not already have a [employer] account, sign up at: [email or web address]  You can access your medical information, make appointments, see lab results, billing information, and more.  If you have questions regarding this referral, please contact  the [facility] Referrals department at:              [phone]. Monday - Friday 7:30AM - 5:00PM.      Sincerely,  [facility]

## 2025-03-14 NOTE — TELEPHONE ENCOUNTER
Received request via: Patient    Was the patient seen in the last year in this department? Yes    Does the patient have an active prescription (recently filled or refills available) for medication(s) requested? No    Pharmacy Name: [facility]    Does the patient have skilled nursing Plus and need 100-day supply? (This applies to ALL medications) Patient does not have SCP

## 2025-04-01 NOTE — TELEPHONE ENCOUNTER
Received request via: Patient    Was the patient seen in the last year in this department? Yes    Does the patient have an active prescription (recently filled or refills available) for medication(s) requested? No    Pharmacy Name: [facility]    Does the patient have [facility] Plus and need 100-day supply? (This applies to ALL medications) Patient does not have SCP

## 2025-04-07 ENCOUNTER — OFFICE VISIT (OUTPATIENT)
Dept: URGENT CARE | Facility: PHYSICIAN GROUP | Age: 40
End: 2025-04-07
Payer: COMMERCIAL

## 2025-04-07 VITALS
OXYGEN SATURATION: 99 % | BODY MASS INDEX: 33.13 KG/M2 | DIASTOLIC BLOOD PRESSURE: 78 MMHG | RESPIRATION RATE: 16 BRPM | TEMPERATURE: 97.9 F | HEIGHT: 73 IN | SYSTOLIC BLOOD PRESSURE: 132 MMHG | WEIGHT: 250 LBS | HEART RATE: 81 BPM

## 2025-04-07 DIAGNOSIS — L03.012 CELLULITIS OF LEFT THUMB: ICD-10-CM

## 2025-04-07 DIAGNOSIS — R22.32 LOCALIZED SWELLING OF LEFT THUMB: ICD-10-CM

## 2025-04-07 PROCEDURE — 3075F SYST BP GE 130 - 139MM HG: CPT | Performed by: STUDENT IN AN ORGANIZED HEALTH CARE EDUCATION/TRAINING PROGRAM

## 2025-04-07 PROCEDURE — 3078F DIAST BP <80 MM HG: CPT | Performed by: STUDENT IN AN ORGANIZED HEALTH CARE EDUCATION/TRAINING PROGRAM

## 2025-04-07 PROCEDURE — 99213 OFFICE O/P EST LOW 20 MIN: CPT | Performed by: STUDENT IN AN ORGANIZED HEALTH CARE EDUCATION/TRAINING PROGRAM

## 2025-04-07 RX ORDER — IBUPROFEN 600 MG/1
600 TABLET, FILM COATED ORAL EVERY 8 HOURS PRN
Qty: 20 TABLET | Refills: 0 | Status: SHIPPED | OUTPATIENT
Start: 2025-04-07

## 2025-04-07 RX ORDER — AMOXICILLIN 500 MG/1
500 CAPSULE ORAL 3 TIMES DAILY
Qty: 15 CAPSULE | Refills: 0 | Status: SHIPPED | OUTPATIENT
Start: 2025-04-07 | End: 2025-04-12

## 2025-04-07 ASSESSMENT — ENCOUNTER SYMPTOMS
TINGLING: 0
WEAKNESS: 0
CHILLS: 0
FEVER: 0
SENSORY CHANGE: 0

## 2025-04-07 ASSESSMENT — FIBROSIS 4 INDEX: FIB4 SCORE: 0.42

## 2025-04-08 NOTE — PROGRESS NOTES
"Subjective     Jose Miguel Luna is a 39 y.o. male who presents with Other (Possible Splinter under L Thumb, feels a lot of pressure )            Jose Miguel is a 39 y.o. male who presents to urgent care with left thumb swelling and redness.  Patient states he woke up with his thumb swollen and red.  No injury or trauma to his thumb.  He does work in construction so thinks that maybe he potentially got a splinter under his fingernail or in his finger.  No cuts, abrasions or lacerations.  Patient believes it is infected.  He has not taken any OTC medications to assist with pain or swelling.        Review of Systems   Constitutional:  Negative for chills and fever.   Musculoskeletal:  Positive for joint pain.   Neurological:  Negative for tingling, sensory change and weakness.   All other systems reviewed and are negative.             Objective     /78   Pulse 81   Temp 36.6 °C (97.9 °F) (Temporal)   Resp 16   Ht 1.854 m (6' 1\")   Wt 113 kg (250 lb)   SpO2 99%   BMI 32.98 kg/m²      Physical Exam  Vitals reviewed.   Constitutional:       Appearance: Normal appearance.   HENT:      Head: Normocephalic and atraumatic.   Eyes:      Extraocular Movements: Extraocular movements intact.      Conjunctiva/sclera: Conjunctivae normal.      Pupils: Pupils are equal, round, and reactive to light.   Musculoskeletal:      Left wrist: Normal.      Left hand: Swelling (with faint erythema localized to left thumb. No signs of trauma.) and tenderness (localized to left thumb) present. Normal capillary refill. Normal pulse.   Skin:     General: Skin is warm and dry.      Capillary Refill: Capillary refill takes less than 2 seconds.   Neurological:      General: No focal deficit present.      Mental Status: He is alert and oriented to person, place, and time.                                  Assessment & Plan  Localized swelling of left thumb    Orders:    DX-FINGER(S) 2+ LEFT; Future    ibuprofen (MOTRIN) 600 MG Tab; " Take 1 Tablet by mouth every 8 hours as needed for Inflammation or Moderate Pain.    Cellulitis of left thumb    Orders:    amoxicillin (AMOXIL) 500 MG Cap; Take 1 Capsule by mouth 3 times a day for 5 days.           X-ray not available in clinic today.  Outpatient orders placed for x-ray of left thumb.  Patient will go to [facility] tomorrow to get x-ray.  Results will be available via [employer].      Differential diagnoses, supportive care measures and indications for immediate follow-up discussed with patient. Pathogenesis of diagnosis discussed including typical length and natural progression.      Instructed to return to urgent care or nearest emergency department if symptoms fail to improve, for any change in condition, further concerns, or new concerning symptoms.    Patient states understanding and agrees with the plan of care and discharge instructions.

## 2025-04-14 NOTE — TELEPHONE ENCOUNTER
Received request via: Patient    Was the patient seen in the last year in this department? Yes    Does the patient have an active prescription (recently filled or refills available) for medication(s) requested? No    Pharmacy Name: [facility]    Does the patient have alf Plus and need 100-day supply? (This applies to ALL medications) Patient does not have SCP

## 2025-04-16 ENCOUNTER — OFFICE VISIT (OUTPATIENT)
Dept: MEDICAL GROUP | Facility: PHYSICIAN GROUP | Age: 40
End: 2025-04-16
Payer: COMMERCIAL

## 2025-04-16 VITALS
DIASTOLIC BLOOD PRESSURE: 84 MMHG | BODY MASS INDEX: 33.09 KG/M2 | OXYGEN SATURATION: 97 % | TEMPERATURE: 98.8 F | HEIGHT: 72 IN | WEIGHT: 244.27 LBS | SYSTOLIC BLOOD PRESSURE: 126 MMHG | HEART RATE: 78 BPM | RESPIRATION RATE: 14 BRPM

## 2025-04-16 DIAGNOSIS — R10.11 RUQ PAIN: ICD-10-CM

## 2025-04-16 DIAGNOSIS — E11.9 TYPE 2 DIABETES MELLITUS WITHOUT COMPLICATION, WITHOUT LONG-TERM CURRENT USE OF INSULIN (HCC): ICD-10-CM

## 2025-04-16 DIAGNOSIS — Z13.9 ENCOUNTER FOR SCREENING: ICD-10-CM

## 2025-04-16 PROCEDURE — 99213 OFFICE O/P EST LOW 20 MIN: CPT

## 2025-04-16 PROCEDURE — 3074F SYST BP LT 130 MM HG: CPT

## 2025-04-16 PROCEDURE — 3079F DIAST BP 80-89 MM HG: CPT

## 2025-04-16 ASSESSMENT — FIBROSIS 4 INDEX: FIB4 SCORE: 0.42

## 2025-04-16 NOTE — PROGRESS NOTES
Verbal consent was acquired by the patient to use [employer] ambient listening note generation during this visit     Subjective:     HPI:   History of Present Illness  The patient presents for evaluation of diabetes mellitus and right upper quadrant pain.    Diabetes Mellitus  Significant weight loss is reported, attributed to dietary modifications. Despite these changes, blood glucose levels remain elevated, consistently measuring in the lower 300s. Ozempic has not been taken due to insurance issues, which have recently been resolved. Advice is sought on whether to resume the medication. The last recorded hemoglobin A1c level was 12.5. Overall health improvement is noted, with better knee function and less fatigue, although occasional muscle weakness is experienced. Consideration is being given to joining a gym to increase physical activity. An endocrinologist appointment is scheduled for next week. Polyuria continues, necessitating hourly bathroom visits, and adequate hydration is maintained. Bowel movements are regular. Metformin 2000 mg daily is taken in divided doses throughout the day, with a preference for taking the medication with food due to delayed breakfast until 9:00 AM. Blood glucose levels typically range from 260 to 270 after work. Missing a dose of metformin does not always result in a spike in blood glucose levels.  - Onset: Not specified.  - Duration: Persistent elevated blood glucose levels despite dietary modifications.  - Character: Elevated blood glucose levels, polyuria, occasional muscle weakness.  - Alleviating/Aggravating Factors: Dietary modifications, metformin, Ozempic (insurance issues), adequate hydration.  - Timing: Blood glucose levels typically range from 260 to 270 after work.  - Severity: Blood glucose levels consistently in the lower 300s, hemoglobin A1c level of 12.5.    Right Upper Quadrant Pain  Intermittent sharp pains in the right upper quadrant of the abdomen have been  present for approximately one month. These episodes occur randomly, sometimes while driving or working, and are severe enough to cause doubling over. The pain lasts for several minutes and is alleviated by ibuprofen. The pain is not believed to be gastrointestinal in nature.  - Onset: Approximately one month ago.  - Location: Right upper quadrant of the abdomen.  - Duration: Pain lasts for several minutes.  - Character: Intermittent sharp pains.  - Alleviating Factors: Ibuprofen.  - Timing: Occurs randomly, sometimes while driving or working.  - Severity: Severe enough to cause doubling over.    FAMILY HISTORY  His mother is diabetic and requires dialysis daily.    Health Maintenance: Completed    Objective:     Exam:  /84   Pulse 78   Temp 37.1 °C (98.8 °F) (Temporal)   Resp 14   Ht 1.829 m (6')   Wt 111 kg (244 lb 4.3 oz)   SpO2 97%   BMI 33.13 kg/m²  Body mass index is 33.13 kg/m².    Physical Exam  Constitutional:       General: He is not in acute distress.     Appearance: Normal appearance. He is not ill-appearing.   Eyes:      Conjunctiva/sclera: Conjunctivae normal.   Cardiovascular:      Rate and Rhythm: Normal rate and regular rhythm.      Heart sounds: No murmur heard.  Pulmonary:      Effort: Pulmonary effort is normal. No respiratory distress.      Breath sounds: Normal breath sounds. No wheezing or rhonchi.   Abdominal:      General: Abdomen is flat.      Palpations: Abdomen is soft. There is no mass.      Tenderness: There is abdominal tenderness.   Skin:     General: Skin is warm and dry.      Capillary Refill: Capillary refill takes less than 2 seconds.   Neurological:      General: No focal deficit present.      Mental Status: He is alert and oriented to person, place, and time.   Psychiatric:         Mood and Affect: Mood normal.             Results  Labs   - Blood sugar levels: Around lower 300s   - Hemoglobin A1c: 12.5    Assessment & Plan:     1. Type 2 diabetes mellitus without  complication, without long-term current use of insulin (HCC)  CBC WITH DIFFERENTIAL    Comp Metabolic Panel    Lipid Profile    HEMOGLOBIN A1C    CANCELED: POCT Hemoglobin A1C      2. RUQ pain  US-ABDOMEN COMPLETE SURVEY      3. Encounter for screening  HEP C VIRUS ANTIBODY          Assessment & Plan  1. Diabetes mellitus: Chronic. Hemoglobin A1c increased from 8.5 to 12.5. Significant weight loss of 50 pounds noted. Blood glucose levels remain elevated, consistently in the lower 300s.  - Referred to endocrinologist for further management; advised to discuss Ozempic therapy with endocrinologist.  - Comprehensive laboratory tests ordered.  - Abdominal ultrasound ordered.  - Prescription for 90-day supply of metformin provided.    2. Right upper quadrant pain: Acute. Reports sharp pains underneath right ribs, occurring randomly and severely. Pain has been present for about a month, occurring at least a couple of times a week, lasting a few minutes each time.  - Uses ibuprofen to manage pain.  - Abdominal ultrasound ordered to rule out gallbladder issues or other potential causes.    Follow-up  - Follow-up scheduled in 3 months.      Return in about 3 months (around 7/16/2025) for f/u T2DM.    Please note that this dictation was created using voice recognition software. I have made every reasonable attempt to correct obvious errors, but I expect that there are errors of grammar and possibly content that I did not discover before finalizing the note.

## 2025-04-19 ENCOUNTER — HOSPITAL ENCOUNTER (OUTPATIENT)
Dept: LAB | Facility: MEDICAL CENTER | Age: 40
End: 2025-04-19
Payer: COMMERCIAL

## 2025-04-19 DIAGNOSIS — Z13.9 ENCOUNTER FOR SCREENING: ICD-10-CM

## 2025-04-19 DIAGNOSIS — E11.9 TYPE 2 DIABETES MELLITUS WITHOUT COMPLICATION, WITHOUT LONG-TERM CURRENT USE OF INSULIN (HCC): ICD-10-CM

## 2025-04-19 LAB
ALBUMIN SERPL BCP-MCNC: 4.5 G/DL (ref 3.2–4.9)
ALBUMIN/GLOB SERPL: 1 G/DL
ALP SERPL-CCNC: 116 U/L (ref 30–99)
ALT SERPL-CCNC: 26 U/L (ref 2–50)
ANION GAP SERPL CALC-SCNC: 13 MMOL/L (ref 7–16)
AST SERPL-CCNC: 18 U/L (ref 12–45)
BASOPHILS # BLD AUTO: 0.3 % (ref 0–1.8)
BASOPHILS # BLD: 0.03 K/UL (ref 0–0.12)
BILIRUB SERPL-MCNC: 0.6 MG/DL (ref 0.1–1.5)
BUN SERPL-MCNC: 17 MG/DL (ref 8–22)
CALCIUM ALBUM COR SERPL-MCNC: 9.8 MG/DL (ref 8.5–10.5)
CALCIUM SERPL-MCNC: 10.2 MG/DL (ref 8.5–10.5)
CHLORIDE SERPL-SCNC: 96 MMOL/L (ref 96–112)
CHOLEST SERPL-MCNC: 191 MG/DL (ref 100–199)
CO2 SERPL-SCNC: 24 MMOL/L (ref 20–33)
CREAT SERPL-MCNC: 0.72 MG/DL (ref 0.5–1.4)
EOSINOPHIL # BLD AUTO: 0.15 K/UL (ref 0–0.51)
EOSINOPHIL NFR BLD: 1.5 % (ref 0–6.9)
ERYTHROCYTE [DISTWIDTH] IN BLOOD BY AUTOMATED COUNT: 36 FL (ref 35.9–50)
EST. AVERAGE GLUCOSE BLD GHB EST-MCNC: 410 MG/DL
FASTING STATUS PATIENT QL REPORTED: NORMAL
GFR SERPLBLD CREATININE-BSD FMLA CKD-EPI: 119 ML/MIN/1.73 M 2
GLOBULIN SER CALC-MCNC: 4.6 G/DL (ref 1.9–3.5)
GLUCOSE SERPL-MCNC: 358 MG/DL (ref 65–99)
HBA1C MFR BLD: 15.9 % (ref 4–5.6)
HCT VFR BLD AUTO: 47.8 % (ref 42–52)
HCV AB SER QL: NORMAL
HDLC SERPL-MCNC: 32 MG/DL
HGB BLD-MCNC: 16.2 G/DL (ref 14–18)
IMM GRANULOCYTES # BLD AUTO: 0.04 K/UL (ref 0–0.11)
IMM GRANULOCYTES NFR BLD AUTO: 0.4 % (ref 0–0.9)
LDLC SERPL CALC-MCNC: 123 MG/DL
LYMPHOCYTES # BLD AUTO: 3.42 K/UL (ref 1–4.8)
LYMPHOCYTES NFR BLD: 35.2 % (ref 22–41)
MCH RBC QN AUTO: 28.4 PG (ref 27–33)
MCHC RBC AUTO-ENTMCNC: 33.9 G/DL (ref 32.3–36.5)
MCV RBC AUTO: 83.9 FL (ref 81.4–97.8)
MONOCYTES # BLD AUTO: 0.43 K/UL (ref 0–0.85)
MONOCYTES NFR BLD AUTO: 4.4 % (ref 0–13.4)
NEUTROPHILS # BLD AUTO: 5.65 K/UL (ref 1.82–7.42)
NEUTROPHILS NFR BLD: 58.2 % (ref 44–72)
NRBC # BLD AUTO: 0 K/UL
NRBC BLD-RTO: 0 /100 WBC (ref 0–0.2)
PLATELET # BLD AUTO: 373 K/UL (ref 164–446)
PMV BLD AUTO: 8.8 FL (ref 9–12.9)
POTASSIUM SERPL-SCNC: 4.4 MMOL/L (ref 3.6–5.5)
PROT SERPL-MCNC: 9.1 G/DL (ref 6–8.2)
RBC # BLD AUTO: 5.7 M/UL (ref 4.7–6.1)
SODIUM SERPL-SCNC: 133 MMOL/L (ref 135–145)
TRIGL SERPL-MCNC: 180 MG/DL (ref 0–149)
WBC # BLD AUTO: 9.7 K/UL (ref 4.8–10.8)

## 2025-04-19 PROCEDURE — 36415 COLL VENOUS BLD VENIPUNCTURE: CPT

## 2025-04-19 PROCEDURE — 83036 HEMOGLOBIN GLYCOSYLATED A1C: CPT

## 2025-04-19 PROCEDURE — 85025 COMPLETE CBC W/AUTO DIFF WBC: CPT

## 2025-04-19 PROCEDURE — 86803 HEPATITIS C AB TEST: CPT

## 2025-04-19 PROCEDURE — 80053 COMPREHEN METABOLIC PANEL: CPT

## 2025-04-19 PROCEDURE — 80061 LIPID PANEL: CPT

## 2025-04-22 ENCOUNTER — TELEPHONE (OUTPATIENT)
Dept: MEDICAL GROUP | Facility: PHYSICIAN GROUP | Age: 40
End: 2025-04-22

## 2025-04-22 NOTE — TELEPHONE ENCOUNTER
DOCUMENTATION OF PAR STATUS:    1. Name of Medication & Dose: ozempic      2. Name of Prescription Coverage Company & phone #: [employer]     3. Date Prior Auth Submitted: 4/22/25    4. What information was given to obtain insurance decision? Chart notes    5. Prior Auth Status? Pending    6. Patient Notified: N\A

## 2025-04-25 ENCOUNTER — OFFICE VISIT (OUTPATIENT)
Dept: ENDOCRINOLOGY | Facility: MEDICAL CENTER | Age: 40
End: 2025-04-25
Attending: STUDENT IN AN ORGANIZED HEALTH CARE EDUCATION/TRAINING PROGRAM
Payer: COMMERCIAL

## 2025-04-25 ENCOUNTER — PHARMACY VISIT (OUTPATIENT)
Dept: PHARMACY | Facility: MEDICAL CENTER | Age: 40
End: 2025-04-25
Payer: COMMERCIAL

## 2025-04-25 VITALS
OXYGEN SATURATION: 98 % | BODY MASS INDEX: 33.18 KG/M2 | HEIGHT: 72 IN | SYSTOLIC BLOOD PRESSURE: 126 MMHG | HEART RATE: 70 BPM | DIASTOLIC BLOOD PRESSURE: 88 MMHG | WEIGHT: 245 LBS

## 2025-04-25 DIAGNOSIS — E11.9 NEWLY DIAGNOSED DIABETES (HCC): ICD-10-CM

## 2025-04-25 PROCEDURE — RXMED WILLOW AMBULATORY MEDICATION CHARGE: Performed by: STUDENT IN AN ORGANIZED HEALTH CARE EDUCATION/TRAINING PROGRAM

## 2025-04-25 PROCEDURE — 95249 CONT GLUC MNTR PT PROV EQP: CPT | Performed by: STUDENT IN AN ORGANIZED HEALTH CARE EDUCATION/TRAINING PROGRAM

## 2025-04-25 PROCEDURE — 99211 OFF/OP EST MAY X REQ PHY/QHP: CPT | Performed by: STUDENT IN AN ORGANIZED HEALTH CARE EDUCATION/TRAINING PROGRAM

## 2025-04-25 RX ORDER — SEMAGLUTIDE 0.68 MG/ML
0.25 INJECTION, SOLUTION SUBCUTANEOUS
Qty: 3 ML | Refills: 0 | Status: SHIPPED | OUTPATIENT
Start: 2025-04-25

## 2025-04-25 RX ORDER — HYDROCHLOROTHIAZIDE 12.5 MG/1
CAPSULE ORAL
Qty: 2 EACH | Refills: 11 | Status: SHIPPED | OUTPATIENT
Start: 2025-04-25

## 2025-04-25 ASSESSMENT — FIBROSIS 4 INDEX: FIB4 SCORE: 0.36

## 2025-04-25 NOTE — PATIENT INSTRUCTIONS
Metformin 500 mg 2 tab twice a day with meals - SAME DOSE as before  Ozempic (sample) 0.25 mg weekly          -- if well tolerated can increase dose to 0.5 mg in 2 weeks    3. Lantus (long-acting insulin) 12 units at bedtime daily       --- if your morning sugars > 150 for 4-5 days in a raw -> increase dose by 2 units      -- if you develop any sugar below 70 or morning sugars < 120 -> decrease dose by 2 units same day

## 2025-04-25 NOTE — PROGRESS NOTES
Chief Complaint:  Consult requested by Max Cunningham D.O. for initial evaluation of Type 2 Diabetes Mellitus    HPI:   Jose Miguel Luna is a 39 y.o. male was referred to endocrinology service by PCP to establish care and T2DM management    DM history:  - diagnosed in 12/2024, preceded by prediabetes in 2022  - since 10/2024 - lost 60 lb  - hospitalizations for DKA/HHS/severe hyperglycemia/severe hypoglycemia in the past: none  - prior therapy: Metformin   - known DM complications: none  - latest HbA1C - 15.9 % (4/2025)  - pertinent PMHx:   -- obesity, dyslipidemia, hidradenitis suppurativa, gout, tansaminitis  -- denies NAFLD, HTN; CAD/PAD/CHF/ CVA   - pertinent FHx:  DM 2: mother - DM from her 30s, on insulin     Current therapy:  Metformin 1000 mg bid     Tolerates medications: well  Compliant: yes    BG control:  SBGM x 1/day  FBGs - 300s      Hypoglycemic episodes:  Hypoglycemic symptoms: NA  Hypoglycemic unawareness: NA  Treatment: NA  Severe hypoglycemia: NA  Has medical bracelet/necklace:NA  Has glucagon emergency kit: NA    Past Medical History:  Patient Active Problem List    Diagnosis Date Noted    Class 2 obesity due to excess calories with body mass index (BMI) of 36.0 to 36.9 in adult 02/21/2025    Hidradenitis suppurativa 02/21/2025    Type 2 diabetes mellitus (HCC) 12/20/2024    Gout 12/20/2024    Flank pain 09/12/2022    Transaminitis 04/11/2022     Past Surgical History:  No past surgical history on file.     Allergies:  Patient has no known allergies.     Social History:  Social History     Socioeconomic History    Marital status: Single     Spouse name: Not on file    Number of children: Not on file    Years of education: Not on file    Highest education level: GED or equivalent   Occupational History    Not on file   Tobacco Use    Smoking status: Former     Types: Cigarettes    Smokeless tobacco: Never   Vaping Use    Vaping status: Never Used   Substance and Sexual Activity     Alcohol use: Yes    Drug use: Not Currently     Comment: denies    Sexual activity: Yes     Partners: Female     Birth control/protection: None     Comment: multiple    Other Topics Concern    Not on file   Social History Narrative    Not on file     Social Drivers of Health     Financial Resource Strain: Medium Risk (12/20/2024)    Overall Financial Resource Strain (CARDIA)     Difficulty of Paying Living Expenses: Somewhat hard   Food Insecurity: No Food Insecurity (12/20/2024)    Hunger Vital Sign     Worried About Running Out of Food in the Last Year: Never true     Ran Out of Food in the Last Year: Never true   Transportation Needs: No Transportation Needs (12/20/2024)    PRAPARE - Transportation     Lack of Transportation (Medical): No     Lack of Transportation (Non-Medical): No   Physical Activity: Sufficiently Active (12/20/2024)    Exercise Vital Sign     Days of Exercise per Week: 5 days     Minutes of Exercise per Session: 150+ min   Stress: Stress Concern Present (12/20/2024)    [employer] - Occupational Stress Questionnaire     Feeling of Stress : Very much   Social Connections: Moderately Integrated (12/20/2024)    Social Connection and Isolation Panel [NHANES]     Frequency of Communication with Friends and Family: Three times a week     Frequency of Social Gatherings with Friends and Family: Twice a week     Attends Anabaptism Services: 1 to 4 times per year     Active Member of Clubs or Organizations: No     Attends Club or Organization Meetings: Never     Marital Status:    Intimate Partner Violence: Not on file   Housing Stability: High Risk (12/20/2024)    Housing Stability Vital Sign     Unable to Pay for Housing in the Last Year: Yes     Number of Times Moved in the Last Year: Not on file     Homeless in the Last Year: No      Family History:   No family history on file.    Current Medications:  Current Outpatient Medications:     metFORMIN (GLUCOPHAGE) 500  MG Tab, TAKE 2 TABLETS BY MOUTH EVERY MORNING AND 1 TABLET EVERY NIGHT FOR 1 WEEK, THEN INCREASE TO 2 TABLETS TWICE DAILY, Disp: 60 Tablet, Rfl: 0    ibuprofen (MOTRIN) 600 MG Tab, Take 1 Tablet by mouth every 8 hours as needed for Inflammation or Moderate Pain., Disp: 20 Tablet, Rfl: 0    Semaglutide,0.25 or 0.5MG/DOS, 2 MG/3ML Solution Pen-injector, Inject 0.25 mg under the skin every 7 days. (Patient not taking: Reported on 4/16/2025), Disp: 3 mL, Rfl: 3    clindamycin (CLEOCIN) 1 % Solution, Apply 1 mL topically 2 times a day. (Patient not taking: Reported on 4/16/2025), Disp: 60 mL, Rfl: 1    multivitamin Tab, Take 1 Tablet by mouth every day., Disp: , Rfl:     ibuprofen (MOTRIN) 200 MG Tab, Take 400 mg by mouth every day. Pt takes 2 tablets (200MG)  QDAY, Disp: , Rfl:     PHYSICAL EXAM:   Vital signs: /88   Pulse 70   Ht 1.829 m (6')   Wt 111 kg (245 lb)   SpO2 98%   BMI 33.23 kg/m²   GENERAL: Well-developed, well-nourished  in no apparent distress.   EYE: No ocular and eyelid asymmetry, Anicteric sclerae,  PERRL, No exophthalmos or lidlag  HENT: Hearing grossly intact, Normocephalic, atraumatic.  NECK: Supple. Trachea midline.   CARDIOVASCULAR: Regular rate and rhythm.   LUNGS: No dyspnea  ABDOMEN: Soft, nondistended  EXTREMITIES: No clubbing, cyanosis, or edema.   NEUROLOGICAL: Cranial nerves II-XII are grossly intact No visible tremor with both outstretched hands  SKIN: No rashes, lesions. Turgor is normal.    Labs:  - reviewed  HbA1C/BG/Hb:   Reference Range  04/11/22 12/03/24 02/21/25 04/19/25    HbA1C 4.0 - 5.6 % 5.7 (H) 8.5 (H) 12.5 ! 15.9 (H)      Reference Range 04/19/25   Hb 14.0 - 18.0 g/dL 16.2   Hct 42.0 - 52.0 % 47.8     C-peptide/glucose/T1DM Abs:  No data    Kidney function/MACR:   Reference Range 12/20/24 04/19/25    Creatinine 0.50 - 1.40 mg/dL  0.72   GFR (CKD-EPI) >60 mL/min/1.73 m 2  119   MACR 0 - 30 mg/g 401 (H)      LFTs/FIB-4:   Reference Range  01/17/21 04/11/22   09/28/22 06/21/23 09/30/24 12/03/24 04/19/25    AST(SGOT) 12 - 45 U/L 158 (H) 60 (H) 89 (H) 32 60 (H) 21 18   ALT(SGPT) 2 - 50 U/L 287 (H) 108 (H) 95 (H) 41 52 (H) 35 26   ALP 30 - 99 U/L 96 86 87 77 101 (H) 115 (H) 116 (H)     Lipid panel:   Reference Range 04/19/25    Triglycerides 0 - 149 mg/dL 180 (H)   HDL >=40 mg/dL 32 !   LDL <100 mg/dL 123 (H)     Hypothyroidism screening:  No data    ASSESSMENT/PLAN:   # Newly diagnosed DM  - duration x 4 mo  - on Metformin only  - HbA1C  - 15.9% (4/2025)     Microvascular complications: nephropathy with microalbuminuria, denies peripheral neuropathy,retinopathy  Macrovascular complications: denies CAD, CVA, PAD  Associated comorbidities: obesity, dyslipidemia, hidradenitis suppurativa, gout, tansaminitis     DM complication screening:  Labs reviewed:  MACR - 401 (+), last checked in 12/2024  Creat/GFR wnl, last checked in 4/2025  LDL - 123 - not at target, last checked in 4/2025     Patient is taking statin: no  Patient is taking ASA: no  Patient is taking ACE/ARB: no     Last eye exam: 2/25/25  Last foot exam: 12/20/24     Home medications:  Metformin 1000 mg bid     Discussion:  -  new onset diabetes with rapid progression  -  to address glucose toxicity will start basal insulin, continue Metformin, start GLP-1 agonist  - evaluate residual insulin secretion, rule out MICHAEL     Plan:  Discussed with the patient goals for DM management:  Fasting BG 90 - 130  2 hrs postprandial  - 180  HbA1C < 7.0%     Therapy adjustments:  - continue Metformin 1000 mg bid  - start Ozempic 0.25 mg weekly     -- discussed possible side effects  - start Lantus (long-acting insulin) 12 units at bedtime daily       --- if FBGs > 150 for 4-5 days in a raw -> increase dose by 2 units      -- if any BG below 70 or morning sugars < 120 -> decrease dose by 2 units same day    - Semaglutide,0.25 or 0.5MG/DOS, (OZEMPIC, 0.25 OR 0.5 MG/DOSE,) 2 MG/3ML Solution Pen-injector; Inject 0.25 mg under  the skin every 7 days.  Dispense: 3 mL; Refill: 0  - insulin glargine 100 UNIT/ML SC SOPN injection; Inject 12 Units under the skin every evening.  Dispense: 6 mL; Refill: 11  - Insulin Pen Needle 32 G x 4 mm; Use 1 pen needle every day  Dispense: 100 Each; Refill: 11    3. Started on CGM - Kina 3, educated the patient and placed sample in the office.   - Continuous Glucose Sensor (FREESTYLE KINA 3 PLUS SENSOR) Misc; Apply 1 sensor every 15 days  Dispense: 2 Each; Refill: 11    4.  Discussed hypoglycemia symptoms, management.  5. Obtain following labs:  - Comp Metabolic Panel; Future  - C-PEPTIDE; Future  - NELL-65; Future  - TSH WITH REFLEX TO FT4; Future    # Diabetes nephropathy  # Proteinuria  - MACR is 401  - will repeat MACR  - if persisted -> start ACEI/ARB, consider using SGLT-2 inhibitors    # Dyslipidemia  - LDL is not at goal  - not on statin  - continue to monitor, should be on statin since 40 yo    # Obesity class 1  - BMI 33.23  - lost 60 lb due to DM decompensation  - continue lifestyle modifications  - start GLP-1 agonist    # Transaminatis  - improved  - evaluation as per PCP/GI  - if all other causes are ruled out could be due to NAFLD, weight loss is a key therapy    RTC:6 weeks    Total time (face-to-face and non-face-to face time): 45 min - not including CGM education and placement - discussion of diagnoses, treatment, prognosis, medical charts, lab review, documentation.     Plan reviewed with the patient and agreed with plan.  All questions answered to patient's satisfaction.  Thank you kindly for allowing me to participate in the diabetes care plan for this patient.    Dawn Ramsey MD    CC:   Max Cunningham D.O.

## 2025-04-30 ENCOUNTER — RESULTS FOLLOW-UP (OUTPATIENT)
Dept: MEDICAL GROUP | Facility: PHYSICIAN GROUP | Age: 40
End: 2025-04-30

## 2025-04-30 DIAGNOSIS — R73.9 HYPERGLYCEMIA: ICD-10-CM

## 2025-05-03 ENCOUNTER — HOSPITAL ENCOUNTER (OUTPATIENT)
Dept: LAB | Facility: MEDICAL CENTER | Age: 40
End: 2025-05-03
Attending: STUDENT IN AN ORGANIZED HEALTH CARE EDUCATION/TRAINING PROGRAM
Payer: COMMERCIAL

## 2025-05-03 ENCOUNTER — HOSPITAL ENCOUNTER (OUTPATIENT)
Dept: LAB | Facility: MEDICAL CENTER | Age: 40
End: 2025-05-03
Payer: COMMERCIAL

## 2025-05-03 DIAGNOSIS — E11.9 TYPE 2 DIABETES MELLITUS WITHOUT COMPLICATION, WITHOUT LONG-TERM CURRENT USE OF INSULIN (HCC): ICD-10-CM

## 2025-05-03 DIAGNOSIS — E11.9 NEWLY DIAGNOSED DIABETES (HCC): ICD-10-CM

## 2025-05-03 LAB
ALBUMIN SERPL BCP-MCNC: 4 G/DL (ref 3.2–4.9)
ALBUMIN SERPL BCP-MCNC: 4.1 G/DL (ref 3.2–4.9)
ALBUMIN/GLOB SERPL: 1.2 G/DL
ALBUMIN/GLOB SERPL: 1.2 G/DL
ALP SERPL-CCNC: 91 U/L (ref 30–99)
ALP SERPL-CCNC: 91 U/L (ref 30–99)
ALT SERPL-CCNC: 27 U/L (ref 2–50)
ALT SERPL-CCNC: 28 U/L (ref 2–50)
ANION GAP SERPL CALC-SCNC: 9 MMOL/L (ref 7–16)
ANION GAP SERPL CALC-SCNC: 9 MMOL/L (ref 7–16)
AST SERPL-CCNC: 22 U/L (ref 12–45)
AST SERPL-CCNC: 23 U/L (ref 12–45)
BILIRUB SERPL-MCNC: 0.5 MG/DL (ref 0.1–1.5)
BILIRUB SERPL-MCNC: 0.5 MG/DL (ref 0.1–1.5)
BUN SERPL-MCNC: 14 MG/DL (ref 8–22)
BUN SERPL-MCNC: 14 MG/DL (ref 8–22)
CALCIUM ALBUM COR SERPL-MCNC: 9.3 MG/DL (ref 8.5–10.5)
CALCIUM ALBUM COR SERPL-MCNC: 9.4 MG/DL (ref 8.5–10.5)
CALCIUM SERPL-MCNC: 9.4 MG/DL (ref 8.5–10.5)
CALCIUM SERPL-MCNC: 9.4 MG/DL (ref 8.5–10.5)
CHLORIDE SERPL-SCNC: 104 MMOL/L (ref 96–112)
CHLORIDE SERPL-SCNC: 104 MMOL/L (ref 96–112)
CHOLEST SERPL-MCNC: 174 MG/DL (ref 100–199)
CO2 SERPL-SCNC: 24 MMOL/L (ref 20–33)
CO2 SERPL-SCNC: 24 MMOL/L (ref 20–33)
CREAT SERPL-MCNC: 0.59 MG/DL (ref 0.5–1.4)
CREAT SERPL-MCNC: 0.62 MG/DL (ref 0.5–1.4)
FASTING STATUS PATIENT QL REPORTED: NORMAL
GFR SERPLBLD CREATININE-BSD FMLA CKD-EPI: 125 ML/MIN/1.73 M 2
GFR SERPLBLD CREATININE-BSD FMLA CKD-EPI: 127 ML/MIN/1.73 M 2
GLOBULIN SER CALC-MCNC: 3.3 G/DL (ref 1.9–3.5)
GLOBULIN SER CALC-MCNC: 3.4 G/DL (ref 1.9–3.5)
GLUCOSE SERPL-MCNC: 188 MG/DL (ref 65–99)
GLUCOSE SERPL-MCNC: 188 MG/DL (ref 65–99)
HCV AB SER QL: NORMAL
HDLC SERPL-MCNC: 42 MG/DL
LDLC SERPL CALC-MCNC: 112 MG/DL
POTASSIUM SERPL-SCNC: 4.1 MMOL/L (ref 3.6–5.5)
POTASSIUM SERPL-SCNC: 4.1 MMOL/L (ref 3.6–5.5)
PROT SERPL-MCNC: 7.4 G/DL (ref 6–8.2)
PROT SERPL-MCNC: 7.4 G/DL (ref 6–8.2)
SODIUM SERPL-SCNC: 137 MMOL/L (ref 135–145)
SODIUM SERPL-SCNC: 137 MMOL/L (ref 135–145)
TRIGL SERPL-MCNC: 102 MG/DL (ref 0–149)
TSH SERPL DL<=0.005 MIU/L-ACNC: 2 UIU/ML (ref 0.38–5.33)

## 2025-05-03 PROCEDURE — 86803 HEPATITIS C AB TEST: CPT

## 2025-05-03 PROCEDURE — 80053 COMPREHEN METABOLIC PANEL: CPT | Mod: 91

## 2025-05-03 PROCEDURE — 80061 LIPID PANEL: CPT

## 2025-05-03 PROCEDURE — 36415 COLL VENOUS BLD VENIPUNCTURE: CPT

## 2025-05-03 PROCEDURE — 86341 ISLET CELL ANTIBODY: CPT

## 2025-05-03 PROCEDURE — 84443 ASSAY THYROID STIM HORMONE: CPT

## 2025-05-03 PROCEDURE — 84681 ASSAY OF C-PEPTIDE: CPT

## 2025-05-03 PROCEDURE — 80053 COMPREHEN METABOLIC PANEL: CPT

## 2025-05-05 LAB — C PEPTIDE SERPL-MCNC: 2.7 NG/ML (ref 0.5–3.3)

## 2025-05-05 NOTE — TELEPHONE ENCOUNTER
Received request via: Patient    Was the patient seen in the last year in this department? Yes    Does the patient have an active prescription (recently filled or refills available) for medication(s) requested? No    Pharmacy Name: [facility]    Does the patient have FPC Plus and need 100-day supply? (This applies to ALL medications) Patient does not have SCP

## 2025-05-06 LAB — GAD65 AB SER IA-ACNC: <5 IU/ML (ref 0–5)

## 2025-05-15 ENCOUNTER — APPOINTMENT (OUTPATIENT)
Dept: MEDICAL GROUP | Facility: PHYSICIAN GROUP | Age: 40
End: 2025-05-15
Payer: COMMERCIAL

## 2025-05-20 DIAGNOSIS — E11.9 NEWLY DIAGNOSED DIABETES (HCC): ICD-10-CM

## 2025-05-20 PROCEDURE — RXMED WILLOW AMBULATORY MEDICATION CHARGE: Performed by: STUDENT IN AN ORGANIZED HEALTH CARE EDUCATION/TRAINING PROGRAM

## 2025-05-20 RX ORDER — SEMAGLUTIDE 0.68 MG/ML
0.25 INJECTION, SOLUTION SUBCUTANEOUS
Qty: 3 ML | Refills: 0 | Status: SHIPPED | OUTPATIENT
Start: 2025-05-20

## 2025-05-20 NOTE — TELEPHONE ENCOUNTER
Received request via: Patient    Was the patient seen in the last year in this department? Yes    Does the patient have an active prescription (recently filled or refills available) for medication(s) requested? No    Pharmacy Name: [facility]    Does the patient have custodial Plus and need 100-day supply? (This applies to ALL medications) Patient does not have SCP

## 2025-05-22 PROCEDURE — RXMED WILLOW AMBULATORY MEDICATION CHARGE: Performed by: STUDENT IN AN ORGANIZED HEALTH CARE EDUCATION/TRAINING PROGRAM

## 2025-05-23 ENCOUNTER — PHARMACY VISIT (OUTPATIENT)
Dept: PHARMACY | Facility: MEDICAL CENTER | Age: 40
End: 2025-05-23
Payer: COMMERCIAL

## 2025-06-06 ENCOUNTER — TELEMEDICINE (OUTPATIENT)
Dept: ENDOCRINOLOGY | Facility: MEDICAL CENTER | Age: 40
End: 2025-06-06
Attending: STUDENT IN AN ORGANIZED HEALTH CARE EDUCATION/TRAINING PROGRAM
Payer: COMMERCIAL

## 2025-06-06 VITALS — BODY MASS INDEX: 34.54 KG/M2 | HEIGHT: 72 IN | WEIGHT: 255 LBS

## 2025-06-06 DIAGNOSIS — E11.9 NEWLY DIAGNOSED DIABETES (HCC): Primary | ICD-10-CM

## 2025-06-06 PROCEDURE — 99214 OFFICE O/P EST MOD 30 MIN: CPT | Mod: 95 | Performed by: STUDENT IN AN ORGANIZED HEALTH CARE EDUCATION/TRAINING PROGRAM

## 2025-06-06 PROCEDURE — 95251 CONT GLUC MNTR ANALYSIS I&R: CPT | Mod: 95 | Performed by: STUDENT IN AN ORGANIZED HEALTH CARE EDUCATION/TRAINING PROGRAM

## 2025-06-06 ASSESSMENT — FIBROSIS 4 INDEX: FIB4 SCORE: 0.43

## 2025-06-06 NOTE — PROGRESS NOTES
Follow up Endocrinology Visit  Initial consult/last visit on: 4/25/25    Patient was presented for a telehealth consultation via secure and encrypted videoconferencing technology.    Location of Provider - office  Location of Patient - [place], at home  Patient Consent - yes  Platform used - Zoom    Chief complaint:  Jose Miguel Luna, 39 y.o., male, who is here for follow up for T2DM management.     Interval history:   - overall doing well but very concerned of 11 lb weight gain since last visit  - concerned about low Bgs at night  - reviewed recent labs and CGM report    Current therapy:  Metformin 1000 mg bid  Ozempic 0.5 mg weekly  Lantus - stopped 4 days ago    BG control:  CGM type - Kina 3  Period - 5/24/25 - 6/6/25  Data were reviewed and discussed with the patient  Active time - 95%  ABG - 99 mg/dL  GV - 15.9%  GMI - 5.7%  TIR - 99%  TAR - high - 0%,   TBR- low -1%, very low - 0%  5/24/25 - 6/6/25    Prior:  SBGM x 1/day  FBGs - 300s      DM history:  - diagnosed in 12/2024, preceded by prediabetes in 2022  - since 10/2024 - lost 60 lb  - hospitalizations for DKA/HHS/severe hyperglycemia/severe hypoglycemia in the past: none  - prior therapy: Metformin   - known DM complications: none  - latest HbA1C - 15.9 % (4/2025)  - pertinent PMHx:   -- obesity, dyslipidemia, hidradenitis suppurativa, gout, tansaminitis  -- denies NAFLD, HTN; CAD/PAD/CHF/ CVA   - pertinent FHx:  DM 2: mother - DM from her 30s, on insulin    Family History:   No family history on file.    Current Medications:  Current Outpatient Medications:     metFORMIN (GLUCOPHAGE) 500 MG Tab, Take 2 Tablets by mouth 2 times a day with meals., Disp: 60 Tablet, Rfl: 0    Semaglutide,0.25 or 0.5MG/DOS, (OZEMPIC, 0.25 OR 0.5 MG/DOSE,) 2 MG/3ML Solution Pen-injector, Inject 0.25 mg under the skin every 7 days., Disp: 3 mL, Rfl: 0    Continuous Glucose Sensor (FREESTYLE KINA 3 PLUS SENSOR) Misc, Apply 1 sensor every 15 days, Disp: 2 Each, Rfl:  11    insulin glargine 100 UNIT/ML SC SOPN injection, Inject 12 Units under the skin every evening., Disp: 6 mL, Rfl: 11    Insulin Pen Needle 32 G x 4 mm, Use 1 pen needle every day, Disp: 100 Each, Rfl: 11    ibuprofen (MOTRIN) 600 MG Tab, Take 1 Tablet by mouth every 8 hours as needed for Inflammation or Moderate Pain., Disp: 20 Tablet, Rfl: 0    Semaglutide,0.25 or 0.5MG/DOS, 2 MG/3ML Solution Pen-injector, Inject 0.25 mg under the skin every 7 days. (Patient not taking: Reported on 4/7/2025), Disp: 3 mL, Rfl: 3    clindamycin (CLEOCIN) 1 % Solution, Apply 1 mL topically 2 times a day. (Patient not taking: Reported on 4/7/2025), Disp: 60 mL, Rfl: 1    multivitamin Tab, Take 1 Tablet by mouth every day., Disp: , Rfl:     ibuprofen (MOTRIN) 200 MG Tab, Take 400 mg by mouth every day. Pt takes 2 tablets (200MG)  QDAY, Disp: , Rfl:     PHYSICAL EXAM:   Vital signs: Ht 1.829 m (6')   Wt 116 kg (255 lb)   BMI 34.58 kg/m²   GENERAL: Well-developed, well-nourished  in no apparent distress.   LUNGS: No dyspnea  NEUROLOGICAL: Cranial nerves II-XII are grossly intact No visible tremor with both outstretched hands    Labs:  - reviewed  HbA1C/BG/Hb:   Reference Range  04/11/22 12/03/24 02/21/25 04/19/25     HbA1C 4.0 - 5.6 % 5.7 (H) 8.5 (H) 12.5 ! 15.9 (H)       Reference Range 04/19/25   Hb 14.0 - 18.0 g/dL 16.2   Hct 42.0 - 52.0 % 47.8     C-peptide/glucose/T1DM Abs:   Reference Range  05/03/25    Glucose 65 - 99 mg/dL 188 (H)   C-Peptide 0.5 - 3.3 ng/mL 2.7   NELL Antibody 0.0 - 5.0 IU/mL <5.0     Kidney function/MACR:   Reference Range 12/20/24 04/19/25    Creatinine 0.50 - 1.40 mg/dL  0.72   GFR (CKD-EPI) >60 mL/min/1.73 m 2  119   MACR 0 - 30 mg/g 401 (H)      LFTs/FIB-4:   Reference Range  09/30/24  12/03/24  04/19/25  5/3/25   AST(SGOT) 12 - 45 U/L 60 (H) 21 18 22   ALT(SGPT) 2 - 50 U/L 52 (H) 35 26 27    ALP 30 - 99 U/L 101 (H) 115 (H) 116 (H) 91     Lipid panel:   Reference Range 04/19/25    Triglycerides 0 -  149 mg/dL 180 (H)   HDL >=40 mg/dL 32 !   LDL <100 mg/dL 123 (H)     Hypothyroidism screening:   Reference Range 05/03/25    TSH 0.380 - 5.330 uIU/mL 2.000     ASSESSMENT/PLAN:   # Newly diagnosed DM, likely T2DM, well controlled  - duration x 4 mo  - on Metformin + GLP-1 agonist  - HbA1C  - 15.9% (4/2025)  - has preserved insulin secretion - 5/2025 - glucose - 188, C-peptide - 2.7  - negative T1DM markers - 5/2025 - negative NELL-65 Abs     Microvascular complications: nephropathy with microalbuminuria, denies peripheral neuropathy,retinopathy  Macrovascular complications: denies CAD, CVA, PAD  Associated comorbidities: obesity, dyslipidemia, hidradenitis suppurativa, gout, tansaminitis     DM complication screening:  Labs reviewed:  MACR - 401 (+), last checked in 12/2024  Creat/GFR wnl, last checked in 4/2025  LDL - 123 - not at target, last checked in 4/2025     Patient is taking statin: no  Patient is taking ASA: no  Patient is taking ACE/ARB: no     Last eye exam: 2/25/25  Last foot exam: 12/20/24     Home medications:  Metformin 1000 mg bid  Ozempic 0.5 mg weekly     Discussion:  - congratulated patient with excellent glycemic control  - agree with stopping basal insulin  - continue current regimen, ok to attempt weaning of Metformin  Prior notes:  4/25/25  -  new onset diabetes with rapid progression  -  to address glucose toxicity will start basal insulin, continue Metformin, start GLP-1 agonist  - evaluate residual insulin secretion, rule out MICHAEL     Plan:  Discussed with the patient goals for DM management:  Fasting BG 90 - 130  2 hrs postprandial  - 180  HbA1C < 7.0%     Therapy adjustments:  - ok to stop Metformin   - continue Ozempic 0.5 mg weekly, consider dose up titration    3. Started on CGM - Kina 3, ok to use it intermittently    # Diabetes nephropathy  # Proteinuria  - repeat MACR  Prior notes:  4/25/25  - MACR is 401  - will repeat MACR  - if persisted -> start ACEI/ARB, consider using  SGLT-2 inhibitors    # Dyslipidemia  Prior notes:  4/25/25  - LDL is not at goal  - not on statin  - continue to monitor, should be on statin since 40 yo    # Obesity class 1  - continue lifestyle modifications  - consider up titration of GLP-1 agonist  - recent weight gain could be explained by better glycemic control and previous weight loss due to poor glycemic control  Prior notes:  4/25/25  - BMI 33.23  - lost 60 lb due to DM decompensation  - continue lifestyle modifications  - start GLP-1 agonist    # Transaminatis  - resolved  - discuss with PCP  Prior notes:  4/25/25  - improved  - evaluation as per PCP/GI  - if all other causes are ruled out could be due to NAFLD, weight loss is a key therapy    RTC: 3 mo     Plan reviewed with the patient and agreed with plan.  All questions answered to patient's satisfaction.  Thank you kindly for allowing me to participate in the diabetes care plan for this patient.    Dawn Ramsey MD    CC:   Max Cunningham D.O.

## 2025-06-09 NOTE — TELEPHONE ENCOUNTER
Received request via: Pharmacy    Was the patient seen in the last year in this department? Yes    Does the patient have an active prescription (recently filled or refills available) for medication(s) requested? No    Pharmacy Name: [facility]    Does the patient have FPC Plus and need 100-day supply? (This applies to ALL medications) Patient does not have SCP

## 2025-06-23 DIAGNOSIS — E11.9 NEWLY DIAGNOSED DIABETES (HCC): ICD-10-CM

## 2025-06-23 PROCEDURE — RXMED WILLOW AMBULATORY MEDICATION CHARGE: Performed by: STUDENT IN AN ORGANIZED HEALTH CARE EDUCATION/TRAINING PROGRAM

## 2025-06-23 RX ORDER — SEMAGLUTIDE 0.68 MG/ML
0.25 INJECTION, SOLUTION SUBCUTANEOUS
Qty: 3 ML | Refills: 0 | Status: SHIPPED | OUTPATIENT
Start: 2025-06-23

## 2025-06-26 ENCOUNTER — PHARMACY VISIT (OUTPATIENT)
Dept: PHARMACY | Facility: MEDICAL CENTER | Age: 40
End: 2025-06-26
Payer: COMMERCIAL

## 2025-07-28 DIAGNOSIS — E11.9 NEWLY DIAGNOSED DIABETES (HCC): ICD-10-CM

## 2025-07-29 PROCEDURE — RXMED WILLOW AMBULATORY MEDICATION CHARGE: Performed by: STUDENT IN AN ORGANIZED HEALTH CARE EDUCATION/TRAINING PROGRAM

## 2025-07-29 RX ORDER — SEMAGLUTIDE 0.68 MG/ML
0.25 INJECTION, SOLUTION SUBCUTANEOUS
Qty: 3 ML | Refills: 0 | Status: SHIPPED | OUTPATIENT
Start: 2025-07-29

## 2025-08-01 ENCOUNTER — PHARMACY VISIT (OUTPATIENT)
Dept: PHARMACY | Facility: MEDICAL CENTER | Age: 40
End: 2025-08-01
Payer: COMMERCIAL

## 2025-08-26 ENCOUNTER — TELEPHONE (OUTPATIENT)
Dept: ENDOCRINOLOGY | Facility: MEDICAL CENTER | Age: 40
End: 2025-08-26
Payer: COMMERCIAL

## 2025-08-26 DIAGNOSIS — E11.9 NEWLY DIAGNOSED DIABETES (HCC): ICD-10-CM

## 2025-08-26 PROCEDURE — RXMED WILLOW AMBULATORY MEDICATION CHARGE: Performed by: STUDENT IN AN ORGANIZED HEALTH CARE EDUCATION/TRAINING PROGRAM

## 2025-08-26 RX ORDER — SEMAGLUTIDE 0.68 MG/ML
0.5 INJECTION, SOLUTION SUBCUTANEOUS
Qty: 3 ML | Refills: 0 | Status: SHIPPED | OUTPATIENT
Start: 2025-08-26

## 2025-08-29 ENCOUNTER — PHARMACY VISIT (OUTPATIENT)
Dept: PHARMACY | Facility: MEDICAL CENTER | Age: 40
End: 2025-08-29
Payer: COMMERCIAL